# Patient Record
Sex: FEMALE | Race: WHITE | Employment: UNEMPLOYED | URBAN - METROPOLITAN AREA
[De-identification: names, ages, dates, MRNs, and addresses within clinical notes are randomized per-mention and may not be internally consistent; named-entity substitution may affect disease eponyms.]

---

## 2017-08-31 ENCOUNTER — HOSPITAL ENCOUNTER (OUTPATIENT)
Age: 57
Setting detail: SPECIMEN
Discharge: HOME | End: 2017-08-31
Payer: COMMERCIAL

## 2017-08-31 ENCOUNTER — HOSPITAL ENCOUNTER (OUTPATIENT)
Facility: CLINIC | Age: 57
Setting detail: OUTPATIENT SURGERY
Discharge: HOME | End: 2017-08-31
Attending: PLASTIC SURGERY | Admitting: PLASTIC SURGERY
Payer: COMMERCIAL

## 2017-08-31 VITALS
RESPIRATION RATE: 16 BRPM | HEIGHT: 65 IN | SYSTOLIC BLOOD PRESSURE: 139 MMHG | BODY MASS INDEX: 29.49 KG/M2 | HEART RATE: 64 BPM | DIASTOLIC BLOOD PRESSURE: 89 MMHG | TEMPERATURE: 96.8 F | WEIGHT: 177 LBS | OXYGEN SATURATION: 94 %

## 2017-08-31 PROCEDURE — 3600000002 HC SURGERY LEVEL 2 BASE: Performed by: PLASTIC SURGERY

## 2017-08-31 PROCEDURE — 7100000010 HC PHASE II RECOVERY - FIRST 15 MIN: Performed by: PLASTIC SURGERY

## 2017-08-31 PROCEDURE — 3600000012 HC SURGERY LEVEL 2 ADDTL 15MIN: Performed by: PLASTIC SURGERY

## 2017-08-31 PROCEDURE — 2500000003 HC RX 250 WO HCPCS: Performed by: PLASTIC SURGERY

## 2017-08-31 RX ORDER — BUPIVACAINE HYDROCHLORIDE AND EPINEPHRINE 2.5; 5 MG/ML; UG/ML
INJECTION, SOLUTION EPIDURAL; INFILTRATION; INTRACAUDAL; PERINEURAL PRN
Status: DISCONTINUED | OUTPATIENT
Start: 2017-08-31 | End: 2017-08-31 | Stop reason: HOSPADM

## 2017-08-31 ASSESSMENT — PAIN - FUNCTIONAL ASSESSMENT: PAIN_FUNCTIONAL_ASSESSMENT: 0-10

## 2017-09-05 LAB — DERMATOLOGY PATHOLOGY REPORT: NORMAL

## 2018-05-07 ENCOUNTER — HOSPITAL ENCOUNTER (OUTPATIENT)
Age: 58
Setting detail: SPECIMEN
Discharge: HOME | End: 2018-05-07
Payer: COMMERCIAL

## 2018-05-07 ENCOUNTER — HOSPITAL ENCOUNTER (OUTPATIENT)
Facility: CLINIC | Age: 58
Setting detail: OUTPATIENT SURGERY
Discharge: HOME | End: 2018-05-07
Attending: PLASTIC SURGERY | Admitting: PLASTIC SURGERY
Payer: COMMERCIAL

## 2018-05-07 VITALS
SYSTOLIC BLOOD PRESSURE: 169 MMHG | OXYGEN SATURATION: 97 % | HEIGHT: 65 IN | RESPIRATION RATE: 16 BRPM | HEART RATE: 65 BPM | WEIGHT: 178 LBS | BODY MASS INDEX: 29.66 KG/M2 | TEMPERATURE: 97.9 F | DIASTOLIC BLOOD PRESSURE: 91 MMHG

## 2018-05-07 PROCEDURE — 3600000012 HC SURGERY LEVEL 2 ADDTL 15MIN: Performed by: PLASTIC SURGERY

## 2018-05-07 PROCEDURE — 7100000010 HC PHASE II RECOVERY - FIRST 15 MIN: Performed by: PLASTIC SURGERY

## 2018-05-07 PROCEDURE — 3600000002 HC SURGERY LEVEL 2 BASE: Performed by: PLASTIC SURGERY

## 2018-05-07 PROCEDURE — 2500000003 HC RX 250 WO HCPCS: Performed by: PLASTIC SURGERY

## 2018-05-07 RX ORDER — BUPIVACAINE HYDROCHLORIDE AND EPINEPHRINE 2.5; 5 MG/ML; UG/ML
INJECTION, SOLUTION EPIDURAL; INFILTRATION; INTRACAUDAL; PERINEURAL PRN
Status: DISCONTINUED | OUTPATIENT
Start: 2018-05-07 | End: 2018-05-07 | Stop reason: HOSPADM

## 2018-05-07 ASSESSMENT — PAIN SCALES - GENERAL: PAINLEVEL_OUTOF10: 0

## 2018-05-07 ASSESSMENT — PAIN - FUNCTIONAL ASSESSMENT: PAIN_FUNCTIONAL_ASSESSMENT: 0-10

## 2018-05-09 LAB — SURGICAL PATHOLOGY REPORT: NORMAL

## 2020-06-24 NOTE — PROGRESS NOTES
Presurgery lab results reviewed from [facility] results. No BUN or creatinine results there since (abnormal) per medical record though ordered by surgeon. . Dr. Michael Iqbal office notified and requested labs be drawn day of surgery to complete the ordered testing. Case reviewed and approved by Dr. Isidro Sesay.

## 2020-06-29 ENCOUNTER — ANESTHESIA EVENT (OUTPATIENT)
Dept: OPERATING ROOM | Age: 60
End: 2020-06-29
Payer: COMMERCIAL

## 2020-06-30 ENCOUNTER — HOSPITAL ENCOUNTER (OUTPATIENT)
Dept: PREADMISSION TESTING | Age: 60
Discharge: HOME | End: 2020-07-04
Payer: COMMERCIAL

## 2020-06-30 PROCEDURE — U0004 COV-19 TEST NON-CDC HGH THRU: HCPCS

## 2020-07-01 ENCOUNTER — TELEPHONE (OUTPATIENT)
Dept: PRIMARY CARE CLINIC | Age: 60
End: 2020-07-01

## 2020-07-02 ENCOUNTER — ANESTHESIA (OUTPATIENT)
Dept: OPERATING ROOM | Age: 60
End: 2020-07-02
Payer: COMMERCIAL

## 2020-07-02 ENCOUNTER — HOSPITAL ENCOUNTER (OUTPATIENT)
Age: 60
Setting detail: OUTPATIENT SURGERY
Discharge: HOME | End: 2020-07-02
Attending: PLASTIC SURGERY | Admitting: PLASTIC SURGERY
Payer: COMMERCIAL

## 2020-07-02 VITALS
HEART RATE: 47 BPM | WEIGHT: 181.4 LBS | TEMPERATURE: 96.1 F | BODY MASS INDEX: 30.22 KG/M2 | SYSTOLIC BLOOD PRESSURE: 106 MMHG | HEIGHT: 65 IN | RESPIRATION RATE: 18 BRPM | OXYGEN SATURATION: 99 % | DIASTOLIC BLOOD PRESSURE: 43 MMHG

## 2020-07-02 VITALS — DIASTOLIC BLOOD PRESSURE: 21 MMHG | SYSTOLIC BLOOD PRESSURE: 52 MMHG | OXYGEN SATURATION: 100 % | TEMPERATURE: 93 F

## 2020-07-02 LAB
BUN BLDV-MCNC: 15 MG/DL (ref 6–20)
CREAT SERPL-MCNC: 1.03 MG/DL (ref 0.5–0.9)
GFR AFRICAN AMERICAN: >60 ML/MIN
GFR NON-AFRICAN AMERICAN: 55 ML/MIN
GFR SERPL CREATININE-BSD FRML MDRD: ABNORMAL ML/MIN/{1.73_M2}
GFR SERPL CREATININE-BSD FRML MDRD: ABNORMAL ML/MIN/{1.73_M2}
SARS-COV-2, PCR: NOT DETECTED
SARS-COV-2, RAPID: NORMAL
SARS-COV-2: NORMAL
SOURCE: NORMAL

## 2020-07-02 PROCEDURE — 88304 TISSUE EXAM BY PATHOLOGIST: CPT

## 2020-07-02 PROCEDURE — 3600000002 HC SURGERY LEVEL 2 BASE: Performed by: PLASTIC SURGERY

## 2020-07-02 PROCEDURE — 2500000003 HC RX 250 WO HCPCS: Performed by: PLASTIC SURGERY

## 2020-07-02 PROCEDURE — 7100000000 HC PACU RECOVERY - FIRST 15 MIN: Performed by: PLASTIC SURGERY

## 2020-07-02 PROCEDURE — 7100000011 HC PHASE II RECOVERY - ADDTL 15 MIN: Performed by: PLASTIC SURGERY

## 2020-07-02 PROCEDURE — 7100000001 HC PACU RECOVERY - ADDTL 15 MIN: Performed by: PLASTIC SURGERY

## 2020-07-02 PROCEDURE — 6360000002 HC RX W HCPCS: Performed by: NURSE ANESTHETIST, CERTIFIED REGISTERED

## 2020-07-02 PROCEDURE — 2580000003 HC RX 258: Performed by: ANESTHESIOLOGY

## 2020-07-02 PROCEDURE — 36415 COLL VENOUS BLD VENIPUNCTURE: CPT

## 2020-07-02 PROCEDURE — 2709999900 HC NON-CHARGEABLE SUPPLY: Performed by: PLASTIC SURGERY

## 2020-07-02 PROCEDURE — 3700000001 HC ADD 15 MINUTES (ANESTHESIA): Performed by: PLASTIC SURGERY

## 2020-07-02 PROCEDURE — 2500000003 HC RX 250 WO HCPCS: Performed by: NURSE ANESTHETIST, CERTIFIED REGISTERED

## 2020-07-02 PROCEDURE — 82565 ASSAY OF CREATININE: CPT

## 2020-07-02 PROCEDURE — 3700000000 HC ANESTHESIA ATTENDED CARE: Performed by: PLASTIC SURGERY

## 2020-07-02 PROCEDURE — 6360000002 HC RX W HCPCS

## 2020-07-02 PROCEDURE — 7100000010 HC PHASE II RECOVERY - FIRST 15 MIN: Performed by: PLASTIC SURGERY

## 2020-07-02 PROCEDURE — 3600000012 HC SURGERY LEVEL 2 ADDTL 15MIN: Performed by: PLASTIC SURGERY

## 2020-07-02 PROCEDURE — 6370000000 HC RX 637 (ALT 250 FOR IP)

## 2020-07-02 PROCEDURE — 84520 ASSAY OF UREA NITROGEN: CPT

## 2020-07-02 RX ORDER — BUPIVACAINE HYDROCHLORIDE AND EPINEPHRINE 2.5; 5 MG/ML; UG/ML
INJECTION, SOLUTION EPIDURAL; INFILTRATION; INTRACAUDAL; PERINEURAL PRN
Status: DISCONTINUED | OUTPATIENT
Start: 2020-07-02 | End: 2020-07-02 | Stop reason: ALTCHOICE

## 2020-07-02 RX ORDER — HYDRALAZINE HYDROCHLORIDE 20 MG/ML
5 INJECTION INTRAMUSCULAR; INTRAVENOUS EVERY 10 MIN PRN
Status: DISCONTINUED | OUTPATIENT
Start: 2020-07-02 | End: 2020-07-02 | Stop reason: HOSPADM

## 2020-07-02 RX ORDER — HYDROCODONE BITARTRATE AND ACETAMINOPHEN 5; 325 MG/1; MG/1
1 TABLET ORAL EVERY 6 HOURS PRN
Qty: 20 TABLET | Refills: 0 | Status: SHIPPED | OUTPATIENT
Start: 2020-07-02 | End: 2020-07-07

## 2020-07-02 RX ORDER — CLINDAMYCIN PHOSPHATE 900 MG/50ML
INJECTION INTRAVENOUS
Status: DISCONTINUED
Start: 2020-07-02 | End: 2020-07-02 | Stop reason: HOSPADM

## 2020-07-02 RX ORDER — ONDANSETRON 2 MG/ML
4 INJECTION INTRAMUSCULAR; INTRAVENOUS
Status: DISCONTINUED | OUTPATIENT
Start: 2020-07-02 | End: 2020-07-02 | Stop reason: HOSPADM

## 2020-07-02 RX ORDER — SODIUM CHLORIDE 0.9 % (FLUSH) 0.9 %
10 SYRINGE (ML) INJECTION PRN
Status: DISCONTINUED | OUTPATIENT
Start: 2020-07-02 | End: 2020-07-02 | Stop reason: HOSPADM

## 2020-07-02 RX ORDER — PROPOFOL 10 MG/ML
INJECTION, EMULSION INTRAVENOUS PRN
Status: DISCONTINUED | OUTPATIENT
Start: 2020-07-02 | End: 2020-07-02 | Stop reason: SDUPTHER

## 2020-07-02 RX ORDER — ONDANSETRON 2 MG/ML
INJECTION INTRAMUSCULAR; INTRAVENOUS PRN
Status: DISCONTINUED | OUTPATIENT
Start: 2020-07-02 | End: 2020-07-02 | Stop reason: SDUPTHER

## 2020-07-02 RX ORDER — SODIUM CHLORIDE, SODIUM LACTATE, POTASSIUM CHLORIDE, CALCIUM CHLORIDE 600; 310; 30; 20 MG/100ML; MG/100ML; MG/100ML; MG/100ML
INJECTION, SOLUTION INTRAVENOUS CONTINUOUS
Status: DISCONTINUED | OUTPATIENT
Start: 2020-07-02 | End: 2020-07-02 | Stop reason: SDUPTHER

## 2020-07-02 RX ORDER — SODIUM CHLORIDE 0.9 % (FLUSH) 0.9 %
10 SYRINGE (ML) INJECTION PRN
Status: DISCONTINUED | OUTPATIENT
Start: 2020-07-02 | End: 2020-07-02 | Stop reason: SDUPTHER

## 2020-07-02 RX ORDER — LIDOCAINE HYDROCHLORIDE 10 MG/ML
INJECTION, SOLUTION EPIDURAL; INFILTRATION; INTRACAUDAL; PERINEURAL PRN
Status: DISCONTINUED | OUTPATIENT
Start: 2020-07-02 | End: 2020-07-02 | Stop reason: SDUPTHER

## 2020-07-02 RX ORDER — DIPHENHYDRAMINE HYDROCHLORIDE 50 MG/ML
12.5 INJECTION INTRAMUSCULAR; INTRAVENOUS
Status: DISCONTINUED | OUTPATIENT
Start: 2020-07-02 | End: 2020-07-02 | Stop reason: HOSPADM

## 2020-07-02 RX ORDER — BUPIVACAINE HYDROCHLORIDE AND EPINEPHRINE 2.5; 5 MG/ML; UG/ML
INJECTION, SOLUTION INFILTRATION; PERINEURAL
Status: DISCONTINUED
Start: 2020-07-02 | End: 2020-07-02 | Stop reason: HOSPADM

## 2020-07-02 RX ORDER — MIDAZOLAM HYDROCHLORIDE 1 MG/ML
INJECTION INTRAMUSCULAR; INTRAVENOUS PRN
Status: DISCONTINUED | OUTPATIENT
Start: 2020-07-02 | End: 2020-07-02 | Stop reason: SDUPTHER

## 2020-07-02 RX ORDER — DEXAMETHASONE SODIUM PHOSPHATE 10 MG/ML
INJECTION, SOLUTION INTRAMUSCULAR; INTRAVENOUS PRN
Status: DISCONTINUED | OUTPATIENT
Start: 2020-07-02 | End: 2020-07-02 | Stop reason: SDUPTHER

## 2020-07-02 RX ORDER — SODIUM CHLORIDE 9 MG/ML
INJECTION, SOLUTION INTRAVENOUS CONTINUOUS
Status: DISCONTINUED | OUTPATIENT
Start: 2020-07-02 | End: 2020-07-02 | Stop reason: HOSPADM

## 2020-07-02 RX ORDER — SCOLOPAMINE TRANSDERMAL SYSTEM 1 MG/1
1 PATCH, EXTENDED RELEASE TRANSDERMAL
Status: DISCONTINUED | OUTPATIENT
Start: 2020-07-02 | End: 2020-07-02 | Stop reason: HOSPADM

## 2020-07-02 RX ORDER — METOCLOPRAMIDE HYDROCHLORIDE 5 MG/ML
10 INJECTION INTRAMUSCULAR; INTRAVENOUS ONCE
Status: COMPLETED | OUTPATIENT
Start: 2020-07-02 | End: 2020-07-02

## 2020-07-02 RX ORDER — PROPOFOL 10 MG/ML
INJECTION, EMULSION INTRAVENOUS CONTINUOUS PRN
Status: DISCONTINUED | OUTPATIENT
Start: 2020-07-02 | End: 2020-07-02 | Stop reason: SDUPTHER

## 2020-07-02 RX ORDER — PROMETHAZINE HYDROCHLORIDE 25 MG/ML
6.25 INJECTION, SOLUTION INTRAMUSCULAR; INTRAVENOUS
Status: DISCONTINUED | OUTPATIENT
Start: 2020-07-02 | End: 2020-07-02 | Stop reason: HOSPADM

## 2020-07-02 RX ORDER — SODIUM CHLORIDE 0.9 % (FLUSH) 0.9 %
10 SYRINGE (ML) INJECTION EVERY 12 HOURS SCHEDULED
Status: DISCONTINUED | OUTPATIENT
Start: 2020-07-02 | End: 2020-07-02 | Stop reason: HOSPADM

## 2020-07-02 RX ORDER — CLINDAMYCIN HYDROCHLORIDE 300 MG/1
300 CAPSULE ORAL 3 TIMES DAILY
Qty: 21 CAPSULE | Refills: 0 | Status: SHIPPED | OUTPATIENT
Start: 2020-07-02 | End: 2020-07-09

## 2020-07-02 RX ORDER — PROPOFOL 10 MG/ML
INJECTION, EMULSION INTRAVENOUS
Status: COMPLETED
Start: 2020-07-02 | End: 2020-07-02

## 2020-07-02 RX ORDER — FENTANYL CITRATE 50 UG/ML
INJECTION, SOLUTION INTRAMUSCULAR; INTRAVENOUS PRN
Status: DISCONTINUED | OUTPATIENT
Start: 2020-07-02 | End: 2020-07-02 | Stop reason: SDUPTHER

## 2020-07-02 RX ORDER — SODIUM CHLORIDE 0.9 % (FLUSH) 0.9 %
10 SYRINGE (ML) INJECTION EVERY 12 HOURS SCHEDULED
Status: DISCONTINUED | OUTPATIENT
Start: 2020-07-02 | End: 2020-07-02 | Stop reason: SDUPTHER

## 2020-07-02 RX ORDER — SCOLOPAMINE TRANSDERMAL SYSTEM 1 MG/1
PATCH, EXTENDED RELEASE TRANSDERMAL
Status: COMPLETED
Start: 2020-07-02 | End: 2020-07-02

## 2020-07-02 RX ORDER — FENTANYL CITRATE 50 UG/ML
25 INJECTION, SOLUTION INTRAMUSCULAR; INTRAVENOUS EVERY 5 MIN PRN
Status: DISCONTINUED | OUTPATIENT
Start: 2020-07-02 | End: 2020-07-02 | Stop reason: HOSPADM

## 2020-07-02 RX ORDER — SODIUM CHLORIDE 9 MG/ML
INJECTION, SOLUTION INTRAVENOUS CONTINUOUS
Status: DISCONTINUED | OUTPATIENT
Start: 2020-07-02 | End: 2020-07-02 | Stop reason: SDUPTHER

## 2020-07-02 RX ORDER — METOCLOPRAMIDE HYDROCHLORIDE 5 MG/ML
INJECTION INTRAMUSCULAR; INTRAVENOUS
Status: COMPLETED
Start: 2020-07-02 | End: 2020-07-02

## 2020-07-02 RX ORDER — CLINDAMYCIN PHOSPHATE 150 MG/ML
INJECTION, SOLUTION INTRAVENOUS PRN
Status: DISCONTINUED | OUTPATIENT
Start: 2020-07-02 | End: 2020-07-02 | Stop reason: SDUPTHER

## 2020-07-02 RX ORDER — SODIUM CHLORIDE, SODIUM LACTATE, POTASSIUM CHLORIDE, CALCIUM CHLORIDE 600; 310; 30; 20 MG/100ML; MG/100ML; MG/100ML; MG/100ML
INJECTION, SOLUTION INTRAVENOUS CONTINUOUS
Status: DISCONTINUED | OUTPATIENT
Start: 2020-07-02 | End: 2020-07-02 | Stop reason: HOSPADM

## 2020-07-02 RX ORDER — EPHEDRINE SULFATE/0.9% NACL/PF 50 MG/5 ML
SYRINGE (ML) INTRAVENOUS PRN
Status: DISCONTINUED | OUTPATIENT
Start: 2020-07-02 | End: 2020-07-02 | Stop reason: SDUPTHER

## 2020-07-02 RX ADMIN — Medication 10 MG: at 09:19

## 2020-07-02 RX ADMIN — Medication 10 MG: at 09:12

## 2020-07-02 RX ADMIN — SODIUM CHLORIDE, POTASSIUM CHLORIDE, SODIUM LACTATE AND CALCIUM CHLORIDE: 600; 310; 30; 20 INJECTION, SOLUTION INTRAVENOUS at 09:07

## 2020-07-02 RX ADMIN — CLINDAMYCIN PHOSPHATE 900 MG: 150 INJECTION, SOLUTION INTRAMUSCULAR; INTRAVENOUS at 08:17

## 2020-07-02 RX ADMIN — SODIUM CHLORIDE, POTASSIUM CHLORIDE, SODIUM LACTATE AND CALCIUM CHLORIDE: 600; 310; 30; 20 INJECTION, SOLUTION INTRAVENOUS at 08:09

## 2020-07-02 RX ADMIN — FENTANYL CITRATE 100 MCG: 50 INJECTION INTRAMUSCULAR; INTRAVENOUS at 08:12

## 2020-07-02 RX ADMIN — DEXAMETHASONE SODIUM PHOSPHATE 5 MG: 10 INJECTION, SOLUTION INTRAMUSCULAR; INTRAVENOUS at 08:31

## 2020-07-02 RX ADMIN — MIDAZOLAM HYDROCHLORIDE 2 MG: 1 INJECTION, SOLUTION INTRAMUSCULAR; INTRAVENOUS at 08:09

## 2020-07-02 RX ADMIN — METOCLOPRAMIDE HYDROCHLORIDE 10 MG: 5 INJECTION INTRAMUSCULAR; INTRAVENOUS at 09:32

## 2020-07-02 RX ADMIN — PROPOFOL 200 MCG/KG/MIN: 10 INJECTION, EMULSION INTRAVENOUS at 08:14

## 2020-07-02 RX ADMIN — ONDANSETRON 4 MG: 2 INJECTION, SOLUTION INTRAMUSCULAR; INTRAVENOUS at 08:31

## 2020-07-02 RX ADMIN — LIDOCAINE HYDROCHLORIDE 50 MG: 10 INJECTION, SOLUTION EPIDURAL; INFILTRATION; INTRACAUDAL; PERINEURAL at 08:12

## 2020-07-02 RX ADMIN — METOCLOPRAMIDE 10 MG: 5 INJECTION, SOLUTION INTRAMUSCULAR; INTRAVENOUS at 09:32

## 2020-07-02 RX ADMIN — PROPOFOL 160 MG: 10 INJECTION, EMULSION INTRAVENOUS at 08:12

## 2020-07-02 RX ADMIN — SODIUM CHLORIDE, POTASSIUM CHLORIDE, SODIUM LACTATE AND CALCIUM CHLORIDE: 600; 310; 30; 20 INJECTION, SOLUTION INTRAVENOUS at 07:48

## 2020-07-02 ASSESSMENT — PULMONARY FUNCTION TESTS
PIF_VALUE: 16
PIF_VALUE: 3
PIF_VALUE: 16
PIF_VALUE: 14
PIF_VALUE: 3
PIF_VALUE: 16
PIF_VALUE: 14
PIF_VALUE: 14
PIF_VALUE: 16
PIF_VALUE: 14
PIF_VALUE: 0
PIF_VALUE: 14
PIF_VALUE: 16
PIF_VALUE: 14
PIF_VALUE: 8
PIF_VALUE: 16
PIF_VALUE: 0
PIF_VALUE: 14
PIF_VALUE: 16
PIF_VALUE: 16
PIF_VALUE: 14
PIF_VALUE: 16
PIF_VALUE: 14
PIF_VALUE: 14
PIF_VALUE: 16
PIF_VALUE: 15
PIF_VALUE: 16
PIF_VALUE: 14
PIF_VALUE: 16
PIF_VALUE: 16
PIF_VALUE: 14
PIF_VALUE: 16
PIF_VALUE: 14
PIF_VALUE: 16
PIF_VALUE: 14
PIF_VALUE: 16
PIF_VALUE: 2
PIF_VALUE: 16

## 2020-07-02 ASSESSMENT — LIFESTYLE VARIABLES: SMOKING_STATUS: 1

## 2020-07-02 ASSESSMENT — PAIN SCALES - GENERAL
PAINLEVEL_OUTOF10: 0
PAINLEVEL_OUTOF10: 0

## 2020-07-02 ASSESSMENT — PAIN - FUNCTIONAL ASSESSMENT: PAIN_FUNCTIONAL_ASSESSMENT: 0-10

## 2020-07-02 NOTE — ANESTHESIA PRE PROCEDURE
Department of Anesthesiology  Preprocedure Note       Name:  Kamilah Marsh   Age:  61 y.o.  :  1960                                          MRN:  [id number]         Date:  2020      Surgeon: Dakota Wright): Joseline Xavier MD    Procedure: Procedure(s):  TRUNK LESION BIOPSY EXCISION - SOFT TISSUE MASS UPPER BACK    Medications prior to admission:   Prior to Admission medications    Medication Sig Start Date End Date Taking? Authorizing Provider   carvedilol (COREG) 12.5 MG tablet Take 12.5 mg by mouth daily   Yes Historical Provider, MD   clopidogrel (PLAVIX) 75 MG tablet Take 75 mg by mouth daily   Yes Historical Provider, MD   azithromycin (ZITHROMAX) 250 MG tablet Take 250 mg by mouth daily   Yes Historical Provider, MD   calcium carbonate (OSCAL) 500 MG TABS tablet Take 500 mg by mouth daily   Yes Historical Provider, MD   atorvastatin (LIPITOR) 80 MG tablet Take 80 mg by mouth daily   Yes Historical Provider, MD   lisinopril (PRINIVIL;ZESTRIL) 30 MG tablet  5/15/16  Yes Historical Provider, MD   ALPRAZolam (XANAX) 0.25 MG tablet Take 0.25 mg by mouth nightly as needed for Sleep. Historical Provider, MD   aspirin 81 MG tablet Take 81 mg by mouth daily    Historical Provider, MD       Current medications:    Current Facility-Administered Medications   Medication Dose Route Frequency Provider Last Rate Last Dose    0.9 % sodium chloride infusion   Intravenous Continuous Shashank Garcia MD        lactated ringers infusion   Intravenous Continuous Shashank Garcia MD        sodium chloride flush 0.9 % injection 10 mL  10 mL Intravenous 2 times per day Shashank Garcia MD        sodium chloride flush 0.9 % injection 10 mL  10 mL Intravenous PRN Shashank Garcia MD           Allergies:     Allergies   Allergen Reactions    Codeine Hives    Keflet [Cephalexin] Swelling     Swelling of the lips    Percocet [Oxycodone-Acetaminophen] Nausea Only       Problem List:    Patient Active Problem List   Diagnosis Code    Trigger finger of left thumb M65.312    Neoplasm of uncertain behavior of skin D48.5       Past Medical History:        Diagnosis Date    HTN (hypertension)     Hyperlipidemia     Hypertension     PONV (postoperative nausea and vomiting)     with general anesthesia not MAC       Past Surgical History:        Procedure Laterality Date    CARDIAC CATHETERIZATION      CAROTID ENDARTERECTOMY  09/20/2016    right    CHOLECYSTECTOMY      EXCISION / BIOPSY SKIN LESION OF TRUNK N/A 8/31/2017    EXCISIONAL BIOPSY LESIONS ON BACK X4, LEFT AXILLAE X1  performed by Jermaine Becker MD at [address] [facility] Bilateral     HYSTERECTOMY      RI EXC SKIN BENIG 1.1-2CM FACE,FACIAL N/A 5/7/2018    EXCISION LESIONS X2 ON BACK performed by Jermaine Becker MD at [address] [place] / [address]      lesions on back x4 and left axillary x1    PRE-MALIGNA[place] / BENIGN SKIN LESION EXCISION  05/07/2018    excision lesion x3 back    ROTATOR CUFF REPAIR Right        Social History:    Social History     Tobacco Use    Smoking status: Current Every Day Smoker     Packs/day: 0.50     Start date: 6/19/1981    Smokeless tobacco: Never Used   Substance Use Topics    Alcohol use: Yes     Comment: not daily only socially                                Ready to quit: Not Answered  Counseling given: Not Answered      Vital Signs (Current):   Vitals:    06/19/20 1520 07/02/20 0706   Weight: 180 lb (81.6 kg)    Height: 5' 5\" (1.651 m) 5' 5\" (1.651 m)                                              BP Readings from Last 3 Encounters:   02/12/20 (!) 139/90   05/23/18 128/66   05/07/18 (!) 169/91       NPO Status:  after MN                                                                               BMI:   Wt Readings from Last 3 Encounters:   06/19/20 180 lb (81.6 kg)   02/12/20 168 lb (76.2 kg)   05/23/18 160 lb (72.6 kg)     Body mass index is 29.95 kg/m².     CBC: No results found for: WBC, RBC,

## 2020-07-02 NOTE — OP NOTE
Dermabond and Steri-Strips. Patient tolerated the procedure well was taken to postop recovery in stable condition.

## 2020-07-02 NOTE — ANESTHESIA POSTPROCEDURE EVALUATION
Department of Anesthesiology  Postprocedure Note    Patient: Portia Montemayor  MRN: [id number]  YOB: 1960  Date of evaluation: 7/2/2020  Time:  10:04 AM     Procedure Summary     Date:  07/02/20 Room / Location:  [facility] OR 01 / 4 [facility]    Anesthesia Start:  0809 Anesthesia Stop:  2495    Procedure:  [address] - SOFT TISSUE MASS UPPER BACK (Left ) Diagnosis:  (SOFT TISSUE MASS UPPER BACK)    Surgeon:  Suleiman Carreon MD Responsible Provider:  PUNEET Engle CRNA    Anesthesia Type:  general ASA Status:  2          Anesthesia Type: general    Kenneth Phase I: Kenneth Score: 5    Kenneth Phase II: Kenneth Score: 10    Last vitals: Reviewed and per EMR flowsheets.        Anesthesia Post Evaluation    Patient location during evaluation: PACU  Patient participation: complete - patient participated  Level of consciousness: awake and alert  Pain score: 0  Airway patency: patent  Nausea & Vomiting: no nausea and no vomiting  Complications: no  Cardiovascular status: blood pressure returned to baseline  Respiratory status: acceptable and room air  Hydration status: euvolemic

## 2020-07-02 NOTE — BRIEF OP NOTE
Brief Postoperative Note      Patient: Aashish Noyola  YOB: 1960  MRN: [id number]    Date of Procedure: 7/2/2020    Pre-Op Diagnosis: SOFT TISSUE MASS UPPER BACK, 10 cm in diameter    Post-Op Diagnosis: Same       Procedure(s):  TRUNK LESION BIOPSY EXCISION - SOFT TISSUE MASS UPPER BACK, submuscular and subfascial approx 10 cm in diameter    Surgeon(s):   Fatoumata Olivier MD    Assistant:  * No surgical staff found *    Anesthesia: General    Estimated Blood Loss (mL): Minimal    Complications: None    Specimens:   ID Type Source Tests Collected by Time Destination   A : SOFT TISSUE MASS LEFT UPPER BACK Tissue Tissue SURGICAL PATHOLOGY A Solange Peña MD 7/2/2020 8988        Implants:  * No implants in log *      Drains: * No LDAs found *    Findings: submuscular and subfascial lipoma    Electronically signed by Fatoumata Olivier MD on 7/2/2020 at 9:06 AM

## 2020-07-02 NOTE — H&P
Office Note     SANDY Boyd MD, FACS     Subjective:      Patient ID: Irineo Albright is a 61 y.o. female.     HPI  Patient comes in today with a large soft tissue mass on her left upper back consistent with a lipoma. It has continued to grow and is bothering her, interfering with her daily activities. An ultrasound was performed confirming the lipoma. She comes in today for further consultation and treatment options.   Review of Systems     Past Medical History        Past Medical History:   Diagnosis Date    HTN (hypertension)      Hyperlipidemia      Hypertension      PONV (postoperative nausea and vomiting)           Past Surgical History         Past Surgical History:   Procedure Laterality Date    CARDIAC CATHETERIZATION        CAROTID ENDARTERECTOMY   09/20/2016     right    CHOLECYSTECTOMY        EXCISION / BIOPSY SKIN LESION OF TRUNK N/A 8/31/2017     EXCISIONAL BIOPSY LESIONS ON BACK X4, LEFT AXILLAE X1  performed by Nicolás Genao MD at [address] [facility] Bilateral      HYSTERECTOMY        RI EXC SKIN BENIG 1.1-2CM FACE,FACIAL N/A 5/7/2018     EXCISION LESIONS X2 ON BACK performed by Nicolás Genao MD at [address] [place] / BENIGN SKIN LESION EXCISION         lesions on back x4 and left axillary x1    PRE-MALIGNANT / BENIGN SKIN LESION EXCISION   05/07/2018     excision lesion x3 back    ROTATOR CUFF REPAIR                   Allergies   Allergen Reactions    Codeine Hives    Keflet [Cephalexin] Swelling       Swelling of the lips    Percocet [Oxycodone-Acetaminophen] Nausea Only      Current Facility-Administered Medications          Current Outpatient Medications   Medication Sig Dispense Refill    carvedilol (COREG) 12.5 MG tablet Take 12.5 mg by mouth daily        ALPRAZolam (XANAX) 0.25 MG tablet Take 0.25 mg by mouth nightly as needed for Sleep.        clopidogrel (PLAVIX) 75 MG tablet Take 75 mg by mouth daily        calcium carbonate (OSCAL) 500 MG TABS tablet Take 500 mg by mouth daily        atorvastatin (LIPITOR) 80 MG tablet Take 80 mg by mouth daily        lisinopril (PRINIVIL;ZESTRIL) 30 MG tablet          aspirin 81 MG tablet Take 81 mg by mouth daily        azithromycin (ZITHROMAX) 250 MG tablet Take 250 mg by mouth daily          No current facility-administered medications for this visit.          Social History               Socioeconomic History    Marital status:        Spouse name: Not on file    Number of children: Not on file    Years of education: Not on file    Highest education level: Not on file   Occupational History    Not on file   Social Needs    Financial resource strain: Not on file    Food insecurity:       Worry: Not on file       Inability: Not on file    Transportation needs:       Medical: Not on file       Non-medical: Not on file   Tobacco Use    Smoking status: Current Every Day Smoker       Packs/day: 0.50    Smokeless tobacco: Never Used   Substance and Sexual Activity    Alcohol use: Yes    Drug use: No    Sexual activity: Not on file   Lifestyle    Physical activity:       Days per week: Not on file       Minutes per session: Not on file    Stress: Not on file   Relationships    Social connections:       Talks on phone: Not on file       Gets together: Not on file       Attends Latter day service: Not on file       Active member of club or organization: Not on file       Attends meetings of clubs or organizations: Not on file       Relationship status: Not on file    Intimate partner violence:       Fear of current or ex partner: Not on file       Emotionally abused: Not on file       Physically abused: Not on file       Forced sexual activity: Not on file   Other Topics Concern    Not on file   Social History Narrative    Not on file         Family History   No family history on file. Review of systems is otherwise negative.   BP (!) 139/90   Pulse 89   Ht 5' 5\" (1.651 m)   Wt 168 lb (76.2 kg)   BMI 27.96 kg/m²         Objective:   Physical Exam  Vitals signs and nursing note reviewed. Constitutional:       Appearance: Normal appearance. She is well-developed. She is not diaphoretic. HENT:      Head: Normocephalic and atraumatic. Nose: Nose normal.   Eyes:      Conjunctiva/sclera: Conjunctivae normal.      Pupils: Pupils are equal, round, and reactive to light. Neck:      Musculoskeletal: Normal range of motion. Vascular: No JVD. Trachea: No tracheal deviation. Cardiovascular:      Rate and Rhythm: Normal rate. Pulmonary:      Effort: Pulmonary effort is normal. No respiratory distress. Breath sounds: No wheezing. Abdominal:      General: There is no distension. Palpations: Abdomen is soft. Musculoskeletal: Normal range of motion. General: No tenderness. Lymphadenopathy:      Cervical: No cervical adenopathy. Skin:     General: Skin is warm and dry. Coloration: Skin is not pale. Findings: No erythema or rash. Nails: There is no clubbing. Comments: Large Subcutaneous soft tissue mass left upper back consistent with a lipoma. Neurological:      Mental Status: She is alert and oriented to person, place, and time. Cranial Nerves: No cranial nerve deficit. Psychiatric:         Speech: Speech normal.         Behavior: Behavior normal.         Thought Content: Thought content normal.         Judgment: Judgment normal.            Assessment:   Large soft tissue mass left upper back consistent with a lipoma. Plan: We will get her scheduled for excision of her large left upper back soft tissue mass under general anesthesia. I discussed the procedure with her at great length. She understands the risk involved and wished to proceed.                   The patient was evaluated and examined with my nurse in the room at all times.   Portions of this note were transcribed using Momo voice recognition technology and as such may reflect some variations in voice recognition.     A Nadene Gitelman, MD

## 2020-07-03 LAB — SURGICAL PATHOLOGY REPORT: NORMAL

## 2023-07-14 ENCOUNTER — HOSPITAL ENCOUNTER (OUTPATIENT)
Age: 63
Setting detail: SPECIMEN
Discharge: HOME | End: 2023-07-14

## 2023-07-14 LAB
ALBUMIN SERPL-MCNC: 4.5 G/DL (ref 3.5–5.2)
ALBUMIN/GLOB SERPL: 1.8 {RATIO} (ref 1–2.5)
ALP SERPL-CCNC: 56 U/L (ref 35–104)
ALT SERPL-CCNC: 18 U/L (ref 5–33)
ANION GAP SERPL CALCULATED.3IONS-SCNC: 17 MMOL/L (ref 9–17)
AST SERPL-CCNC: 18 U/L
BASOPHILS # BLD: 0.08 K/UL (ref 0–0.2)
BASOPHILS NFR BLD: 1 % (ref 0–2)
BILIRUB SERPL-MCNC: 0.4 MG/DL (ref 0.3–1.2)
BUN SERPL-MCNC: 17 MG/DL (ref 8–23)
CALCIUM SERPL-MCNC: 9.5 MG/DL (ref 8.6–10.4)
CHLORIDE SERPL-SCNC: 105 MMOL/L (ref 98–107)
CHOLEST SERPL-MCNC: 125 MG/DL
CHOLESTEROL/HDL RATIO: 2.7
CO2 SERPL-SCNC: 21 MMOL/L (ref 20–31)
CREAT SERPL-MCNC: 1.1 MG/DL (ref 0.5–0.9)
EOSINOPHIL # BLD: 0.07 K/UL (ref 0–0.44)
EOSINOPHILS RELATIVE PERCENT: 1 % (ref 1–4)
ERYTHROCYTE [DISTWIDTH] IN BLOOD BY AUTOMATED COUNT: 14.4 % (ref 11.8–14.4)
GFR SERPL CREATININE-BSD FRML MDRD: 57 ML/MIN/1.73M2
GLUCOSE SERPL-MCNC: 100 MG/DL (ref 70–99)
HCT VFR BLD AUTO: 39.9 % (ref 36.3–47.1)
HDLC SERPL-MCNC: 46 MG/DL
HGB BLD-MCNC: 13.3 G/DL (ref 11.9–15.1)
IMM GRANULOCYTES # BLD AUTO: 0.03 K/UL (ref 0–0.3)
IMM GRANULOCYTES NFR BLD: 0 %
LDLC SERPL CALC-MCNC: 66 MG/DL (ref 0–130)
LYMPHOCYTES # BLD: 34 % (ref 24–43)
LYMPHOCYTES NFR BLD: 2.96 K/UL (ref 1.1–3.7)
MCH RBC QN AUTO: 30.6 PG (ref 25.2–33.5)
MCHC RBC AUTO-ENTMCNC: 33.3 G/DL (ref 28.4–34.8)
MCV RBC AUTO: 91.9 FL (ref 82.6–102.9)
MONOCYTES NFR BLD: 0.68 K/UL (ref 0.1–1.2)
MONOCYTES NFR BLD: 8 % (ref 3–12)
NEUTROPHILS NFR BLD: 56 % (ref 36–65)
NEUTS SEG NFR BLD: 4.89 K/UL (ref 1.5–8.1)
NRBC BLD-RTO: 0 PER 100 WBC
PLATELET # BLD AUTO: 261 K/UL (ref 138–453)
PMV BLD AUTO: 12.3 FL (ref 8.1–13.5)
POTASSIUM SERPL-SCNC: 5 MMOL/L (ref 3.7–5.3)
PROT SERPL-MCNC: 7 G/DL (ref 6.4–8.3)
RBC # BLD AUTO: 4.34 M/UL (ref 3.95–5.11)
SODIUM SERPL-SCNC: 143 MMOL/L (ref 135–144)
T4 FREE SERPL-MCNC: 1.4 NG/DL (ref 0.9–1.7)
TRIGL SERPL-MCNC: 64 MG/DL
TSH SERPL DL<=0.05 MIU/L-ACNC: 1.22 UIU/ML (ref 0.3–5)
WBC OTHER # BLD: 8.7 K/UL (ref 3.5–11.3)

## 2023-09-20 ENCOUNTER — OFFICE VISIT (OUTPATIENT)
Age: 63
End: 2023-09-20

## 2023-09-20 VITALS — BODY MASS INDEX: 29.16 KG/M2 | HEIGHT: 65 IN | WEIGHT: 175 LBS

## 2023-09-20 DIAGNOSIS — M25.511 RIGHT SHOULDER PAIN, UNSPECIFIED CHRONICITY: Primary | ICD-10-CM

## 2023-09-20 RX ORDER — LIDOCAINE HYDROCHLORIDE 10 MG/ML
2 INJECTION, SOLUTION INFILTRATION; PERINEURAL ONCE
Status: COMPLETED | OUTPATIENT
Start: 2023-09-20 | End: 2023-09-20

## 2023-09-20 RX ORDER — METHYLPREDNISOLONE ACETATE 80 MG/ML
80 INJECTION, SUSPENSION INTRA-ARTICULAR; INTRALESIONAL; INTRAMUSCULAR; SOFT TISSUE ONCE
Status: COMPLETED | OUTPATIENT
Start: 2023-09-20 | End: 2023-09-20

## 2023-09-20 RX ADMIN — METHYLPREDNISOLONE ACETATE 80 MG: 80 INJECTION, SUSPENSION INTRA-ARTICULAR; INTRALESIONAL; INTRAMUSCULAR; SOFT TISSUE at 15:23

## 2023-09-20 RX ADMIN — LIDOCAINE HYDROCHLORIDE 2 ML: 10 INJECTION, SOLUTION INFILTRATION; PERINEURAL at 15:22

## 2023-09-20 NOTE — PROGRESS NOTES
Administrations This Visit       lidocaine 1 % injection 2 mL       Admin Date  09/20/2023  15:22 Action  Given Dose  2 mL Route  Intra-artICUlar Site  Shoulder Right Administered By  Rosetta Stiles MA    Ordering Provider: Tima Murcia MD    NDC: 36412-109-21    Lot#: [id number]    : [address]    Patient Supplied?: No              methylPREDNISolone acetate (DEPO-MEDROL) injection 80 mg       Admin Date  09/20/2023  15:23 Action  Given Dose  80 mg Route  Intra-artICUlar Site  Shoulder Right Administered By  Rosetta Stiles MA    Ordering Provider: Tima Murcia MD    NDC: 4186-0287-54    Lot#: [id number]    : [address]. #2  11.[address] [facility]. Patient Supplied?: No                    Medication was administered by provider, Dr Canary Galeazzi. No adverse reactions.     Rosetta Stiles MA.
tablet, , Disp: , Rfl:     aspirin 81 MG tablet, Take 81 mg by mouth daily, Disp: , Rfl:   Allergies   Allergen Reactions    Codeine Hives    Keflet [Cephalexin] Swelling     Swelling of the lips    Percocet [Oxycodone-Acetaminophen] Nausea Only     Social History     Socioeconomic History    Marital status:      Spouse name: Not on file    Number of children: Not on file    Years of education: Not on file    Highest education level: Not on file   Occupational History    Not on file   Tobacco Use    Smoking status: Every Day     Packs/day: .5     Types: Cigarettes     Start date: 6/19/1981    Smokeless tobacco: Never   Vaping Use    Vaping Use: Never used   Substance and Sexual Activity    Alcohol use: Yes     Comment: not daily only socially    Drug use: No    Sexual activity: Not on file   Other Topics Concern    Not on file   Social History Narrative    Not on file     Social Determinants of Health     Financial Resource Strain: Not on file   Food Insecurity: Not on file   Transportation Needs: Not on file   Physical Activity: Not on file   Stress: Not on file   Social Connections: Not on file   Intimate Partner Violence: Not on file   Housing Stability: Not on file     Past Medical History:   Diagnosis Date    HTN (hypertension)     Hyperlipidemia     Hypertension     PONV (postoperative nausea and vomiting)     with general anesthesia not MAC     Past Surgical History:   Procedure Laterality Date    CARDIAC CATHETERIZATION      CAROTID ENDARTERECTOMY  09/20/2016    right    CHOLECYSTECTOMY      EXCISION / [place] N/A 8/31/2017    EXCISIONAL BIOPSY LESIONS ON BACK X4, LEFT AXILLAE X1  performed by Mehul Rockwell MD at [address] [facility] Bilateral     HYSTERECTOMY (CERVIX STATUS UNKNOWN)      AZ EXC B9 LES MRGN XCP SK TG F/E/E/N/L/M 1.1-2.0CM N/A 5/7/2018    EXCISION LESIONS X2 ON BACK performed by Mehul Rockwell MD at [address]    PRE-[place]AL[facility] /

## 2023-09-25 SDOH — ECONOMIC STABILITY: TRANSPORTATION INSECURITY
IN THE PAST 12 MONTHS, HAS LACK OF TRANSPORTATION KEPT YOU FROM MEETINGS, WORK, OR FROM GETTING THINGS NEEDED FOR DAILY LIVING?: NO

## 2023-09-25 SDOH — ECONOMIC STABILITY: FOOD INSECURITY: WITHIN THE PAST 12 MONTHS, YOU WORRIED THAT YOUR FOOD WOULD RUN OUT BEFORE YOU GOT MONEY TO BUY MORE.: SOMETIMES TRUE

## 2023-09-25 SDOH — ECONOMIC STABILITY: FOOD INSECURITY: WITHIN THE PAST 12 MONTHS, THE FOOD YOU BOUGHT JUST DIDN'T LAST AND YOU DIDN'T HAVE MONEY TO GET MORE.: SOMETIMES TRUE

## 2023-09-25 SDOH — ECONOMIC STABILITY: INCOME INSECURITY: HOW HARD IS IT FOR YOU TO PAY FOR THE VERY BASICS LIKE FOOD, HOUSING, MEDICAL CARE, AND HEATING?: NOT VERY HARD

## 2023-09-25 SDOH — ECONOMIC STABILITY: HOUSING INSECURITY
IN THE LAST 12 MONTHS, WAS THERE A TIME WHEN YOU DID NOT HAVE A STEADY PLACE TO SLEEP OR SLEPT IN A SHELTER (INCLUDING NOW)?: NO

## 2023-09-28 ENCOUNTER — OFFICE VISIT (OUTPATIENT)
Age: 63
End: 2023-09-28

## 2023-09-28 VITALS
DIASTOLIC BLOOD PRESSURE: 82 MMHG | RESPIRATION RATE: 16 BRPM | BODY MASS INDEX: 29.46 KG/M2 | HEIGHT: 65 IN | OXYGEN SATURATION: 97 % | HEART RATE: 55 BPM | SYSTOLIC BLOOD PRESSURE: 130 MMHG | WEIGHT: 176.8 LBS | TEMPERATURE: 97 F

## 2023-09-28 DIAGNOSIS — N18.31 CHRONIC RENAL FAILURE, STAGE 3A (HCC): ICD-10-CM

## 2023-09-28 DIAGNOSIS — I10 ESSENTIAL HYPERTENSION: ICD-10-CM

## 2023-09-28 DIAGNOSIS — H61.22 LEFT EAR IMPACTED CERUMEN: ICD-10-CM

## 2023-09-28 DIAGNOSIS — I65.23 BILATERAL CAROTID ARTERY STENOSIS: Primary | ICD-10-CM

## 2023-09-28 DIAGNOSIS — E78.5 HYPERLIPIDEMIA, UNSPECIFIED HYPERLIPIDEMIA TYPE: ICD-10-CM

## 2023-09-28 DIAGNOSIS — I51.81 TAKOTSUBO CARDIOMYOPATHY: ICD-10-CM

## 2023-09-28 DIAGNOSIS — Z12.31 BREAST CANCER SCREENING BY MAMMOGRAM: ICD-10-CM

## 2023-09-28 DIAGNOSIS — I25.10 ATHEROSCLEROSIS OF NATIVE CORONARY ARTERY OF NATIVE HEART WITHOUT ANGINA PECTORIS: ICD-10-CM

## 2023-09-28 DIAGNOSIS — R44.8 FACIAL PRESSURE: ICD-10-CM

## 2023-09-28 DIAGNOSIS — Z72.0 TOBACCO ABUSE: ICD-10-CM

## 2023-09-28 DIAGNOSIS — Z23 ENCOUNTER FOR IMMUNIZATION: ICD-10-CM

## 2023-09-28 PROBLEM — I65.29 STENOSIS OF CAROTID ARTERY: Status: ACTIVE | Noted: 2022-11-18

## 2023-09-28 PROBLEM — I15.2 OBESITY, DIABETES, AND HYPERTENSION SYNDROME (HCC): Status: ACTIVE | Noted: 2022-11-18

## 2023-09-28 PROBLEM — E55.9 VITAMIN D DEFICIENCY: Status: ACTIVE | Noted: 2022-11-18

## 2023-09-28 PROBLEM — F32.A DEPRESSION: Status: ACTIVE | Noted: 2022-11-18

## 2023-09-28 PROBLEM — F41.9 ANXIETY DISORDER: Status: ACTIVE | Noted: 2022-11-18

## 2023-09-28 PROBLEM — E11.69 OBESITY, DIABETES, AND HYPERTENSION SYNDROME (HCC): Status: ACTIVE | Noted: 2022-11-18

## 2023-09-28 PROBLEM — E11.59 OBESITY, DIABETES, AND HYPERTENSION SYNDROME (HCC): Status: ACTIVE | Noted: 2022-11-18

## 2023-09-28 PROBLEM — K21.9 GASTROESOPHAGEAL REFLUX DISEASE: Status: ACTIVE | Noted: 2022-11-18

## 2023-09-28 PROBLEM — E66.9 OBESITY, DIABETES, AND HYPERTENSION SYNDROME (HCC): Status: ACTIVE | Noted: 2022-11-18

## 2023-09-28 RX ORDER — LISINOPRIL 40 MG/1
40 TABLET ORAL DAILY
COMMUNITY
Start: 2023-07-10

## 2023-09-28 RX ORDER — ERGOCALCIFEROL 1.25 MG/1
50000 CAPSULE ORAL WEEKLY
COMMUNITY
Start: 2023-08-22

## 2023-09-28 RX ORDER — EZETIMIBE 10 MG/1
10 TABLET ORAL DAILY
COMMUNITY
Start: 2023-09-17

## 2023-09-28 ASSESSMENT — PATIENT HEALTH QUESTIONNAIRE - PHQ9
SUM OF ALL RESPONSES TO PHQ QUESTIONS 1-9: 4
7. TROUBLE CONCENTRATING ON THINGS, SUCH AS READING THE NEWSPAPER OR WATCHING TELEVISION: 0
SUM OF ALL RESPONSES TO PHQ9 QUESTIONS 1 & 2: 0
1. LITTLE INTEREST OR PLEASURE IN DOING THINGS: 0
8. MOVING OR SPEAKING SO SLOWLY THAT OTHER PEOPLE COULD HAVE NOTICED. OR THE OPPOSITE, BEING SO FIGETY OR RESTLESS THAT YOU HAVE BEEN MOVING AROUND A LOT MORE THAN USUAL: 0
10. IF YOU CHECKED OFF ANY PROBLEMS, HOW DIFFICULT HAVE THESE PROBLEMS MADE IT FOR YOU TO DO YOUR WORK, TAKE CARE OF THINGS AT HOME, OR GET ALONG WITH OTHER PEOPLE: 0
3. TROUBLE FALLING OR STAYING ASLEEP: 2
2. FEELING DOWN, DEPRESSED OR HOPELESS: 0
6. FEELING BAD ABOUT YOURSELF - OR THAT YOU ARE A FAILURE OR HAVE LET YOURSELF OR YOUR FAMILY DOWN: 0
SUM OF ALL RESPONSES TO PHQ QUESTIONS 1-9: 4
4. FEELING TIRED OR HAVING LITTLE ENERGY: 2
SUM OF ALL RESPONSES TO PHQ QUESTIONS 1-9: 4
9. THOUGHTS THAT YOU WOULD BE BETTER OFF DEAD, OR OF HURTING YOURSELF: 0
SUM OF ALL RESPONSES TO PHQ QUESTIONS 1-9: 4
5. POOR APPETITE OR OVEREATING: 0

## 2023-09-28 NOTE — PROGRESS NOTES
Ear flush both ears, curette used. Handled flush well. After obtaining consent, and per orders of Dr. Milo Doe, injection of Prevnar 20 and influenza vaccine given in Right deltoid (flu) left deltoid (prevnar 21) by Segun Williamson MA. Patient tolerated the injection well.
Prediabetic)  Never done    Diabetic Alb to Cr ratio (uACR) test  Never done    Diabetic retinal exam  Never done     There are no preventive care reminders to display for this patient. There are no preventive care reminders to display for this patient. Mammogram: 4/19/2022  PVX: 7/18/2017, 9/2022  DEXA: 9/15/2021, osteopenia  Colonoscopy: 12/28/2022, 3-5    LABS     Hospital Outpatient Visit on 07/14/2023   Component Date Value    WBC 07/14/2023 8.7     RBC 07/14/2023 4.34     Hemoglobin 07/14/2023 13.3     Hematocrit 07/14/2023 39.9     MCV 07/14/2023 91.9     MCH 07/14/2023 30.6     MCHC 07/14/2023 33.3     RDW 07/14/2023 14.4     Platelets 52/80/5496 261     MPV 07/14/2023 12.3     NRBC Automated 07/14/2023 0.0     Seg Neutrophils 07/14/2023 56     Lymphocytes 07/14/2023 34     Monocytes 07/14/2023 8     Eosinophils % 07/14/2023 1     Basophils 07/14/2023 1     Immature Granulocytes 07/14/2023 0     Segs Absolute 07/14/2023 4.89     Absolute Lymph # 07/14/2023 2.96     Absolute Mono # 07/14/2023 0.68     Absolute Eos # 07/14/2023 0.07     Basophils Absolute 07/14/2023 0.08     Absolute Immature Granul* 07/14/2023 0.03     Glucose 07/14/2023 100 (H)     BUN 07/14/2023 17     Creatinine 07/14/2023 1.1 (H)     Est, Glom Filt Rate 07/14/2023 57 (L)     Calcium 07/14/2023 9.5     Sodium 07/14/2023 143     Potassium 07/14/2023 5.0     Chloride 07/14/2023 105     CO2 07/14/2023 21     Anion Gap 07/14/2023 17     Alkaline Phosphatase 07/14/2023 56     ALT 07/14/2023 18     AST 07/14/2023 18     Total Bilirubin 07/14/2023 0.4     Total Protein 07/14/2023 7.0     Albumin 07/14/2023 4.5     Albumin/Globulin Ratio 07/14/2023 1.8     Thyroxine, Free 07/14/2023 1.4     Cholesterol 07/14/2023 125     HDL 07/14/2023 46     LDL Cholesterol 07/14/2023 66     Chol/HDL Ratio 07/14/2023 2.7     Triglycerides 07/14/2023 64     TSH 07/14/2023 1.22         ASSESSMENT/PLAN     1.  Bilateral carotid artery stenosis  Comments:  on

## 2023-09-28 NOTE — PATIENT INSTRUCTIONS
- Call and make a follow-up with vascular surgery about your carotid stenosis. - Labs from 7/14/2023 were reviewed. - I advise trying Mucinex for your current headache. If this does not improve your symptoms after 2 days, let me know and I will send an antibiotic.  - Complete blood work as ordered. - Complete mammogram as ordered. - Influenza and Atuurmc21 vaccines were administered in-office today.

## 2023-10-02 RX ORDER — LISINOPRIL 40 MG/1
40 TABLET ORAL DAILY
Qty: 90 TABLET | Refills: 3 | Status: SHIPPED | OUTPATIENT
Start: 2023-10-02

## 2023-10-02 NOTE — TELEPHONE ENCOUNTER
Mahesh Pinto is calling to request a refill on the following medication(s):    Medication Request:  Requested Prescriptions     Pending Prescriptions Disp Refills    lisinopril (PRINIVIL;ZESTRIL) 40 MG tablet [Pharmacy Med Name: LISINOPRIL TAB 40MG] 90 tablet 3     Sig: TAKE 1 TABLET DAILY       Last Visit Date (If Applicable):  0/07/5652    Next Visit Date:    Visit date not found

## 2023-10-23 ENCOUNTER — HOSPITAL ENCOUNTER (OUTPATIENT)
Dept: MAMMOGRAPHY | Age: 63
Discharge: HOME | End: 2023-10-25
Payer: COMMERCIAL

## 2023-10-23 VITALS — BODY MASS INDEX: 29.16 KG/M2 | WEIGHT: 175 LBS | HEIGHT: 65 IN

## 2023-10-23 DIAGNOSIS — Z12.31 BREAST CANCER SCREENING BY MAMMOGRAM: ICD-10-CM

## 2023-10-23 PROCEDURE — 77063 BREAST TOMOSYNTHESIS BI: CPT

## 2023-11-22 RX ORDER — ERGOCALCIFEROL 1.25 MG/1
CAPSULE ORAL
Qty: 6 CAPSULE | Refills: 0 | Status: SHIPPED | OUTPATIENT
Start: 2023-11-22

## 2023-11-22 NOTE — TELEPHONE ENCOUNTER
Monik De La Cruz is calling to request a refill on the following medication(s):    Medication Request:  Requested Prescriptions     Pending Prescriptions Disp Refills    vitamin D (ERGOCALCIFEROL) 1.25 MG (48475 UT) CAPS capsule [Pharmacy Med Name: VITAMIN D2 CAP 50,000IU] 6 capsule 0     Sig: TAKE 1 CAPSULE EVERY OTHER WEEK (EVERY 2 WEEKS)       Last Visit Date (If Applicable):  7/89/6413    Next Visit Date:    Visit date not found

## 2023-11-22 NOTE — TELEPHONE ENCOUNTER
Shahram Rosas is calling to request a refill on the following medication(s):    Medication Request:  Requested Prescriptions     Pending Prescriptions Disp Refills    vitamin D (ERGOCALCIFEROL) 1.25 MG (31758 UT) CAPS capsule [Pharmacy Med Name: VITAMIN D2 CAP 50,000IU] 6 capsule 0     Sig: TAKE 1 CAPSULE EVERY OTHER WEEK (EVERY 2 WEEKS)       Last Visit Date (If Applicable):  9/17/3646    Next Visit Date:    Visit date not found

## 2024-02-14 RX ORDER — ERGOCALCIFEROL 1.25 MG/1
CAPSULE ORAL
Qty: 6 CAPSULE | Refills: 0 | Status: SHIPPED | OUTPATIENT
Start: 2024-02-14

## 2024-02-14 NOTE — TELEPHONE ENCOUNTER
Enedelia Ramos is calling to request a refill on the following medication(s):    Medication Request:  Requested Prescriptions     Pending Prescriptions Disp Refills    vitamin D (ERGOCALCIFEROL) 1.25 MG (42402 UT) CAPS capsule [Pharmacy Med Name: VITAMIN D2 CAP 50,000IU] 6 capsule 0     Sig: TAKE 1 CAPSULE EVERY OTHER WEEK (EVERY 2 WEEKS)       Last Visit Date (If Applicable):  9/28/2023    Next Visit Date:    Visit date not found

## 2024-02-19 RX ORDER — ATORVASTATIN CALCIUM 80 MG/1
80 TABLET, FILM COATED ORAL DAILY
Qty: 90 TABLET | Refills: 2 | Status: SHIPPED | OUTPATIENT
Start: 2024-02-19

## 2024-02-19 RX ORDER — CARVEDILOL 12.5 MG/1
12.5 TABLET ORAL 2 TIMES DAILY
Qty: 180 TABLET | Refills: 2 | Status: SHIPPED | OUTPATIENT
Start: 2024-02-19

## 2024-02-19 NOTE — TELEPHONE ENCOUNTER
Enedelia Ramos is calling to request a refill on the following medication(s):    Medication Request:  Requested Prescriptions     Pending Prescriptions Disp Refills    carvedilol (COREG) 12.5 MG tablet [Pharmacy Med Name: CARVEDILOL TAB 12.5MG] 180 tablet 2     Sig: TAKE 1 TABLET TWICE A DAY    atorvastatin (LIPITOR) 80 MG tablet [Pharmacy Med Name: ATORVASTATIN TAB 80MG] 90 tablet 2     Sig: TAKE 1 TABLET ONCE DAILY       Last Visit Date (If Applicable):  9/28/2023    Next Visit Date:    Visit date not found

## 2024-03-11 RX ORDER — CLOPIDOGREL BISULFATE 75 MG/1
75 TABLET ORAL DAILY
Qty: 90 TABLET | Refills: 3 | Status: SHIPPED | OUTPATIENT
Start: 2024-03-11

## 2024-03-11 NOTE — TELEPHONE ENCOUNTER
Enedelia Ramos is calling to request a refill on the following medication(s):    Medication Request:  Requested Prescriptions     Pending Prescriptions Disp Refills    clopidogrel (PLAVIX) 75 MG tablet [Pharmacy Med Name: CLOPIDOGREL  TAB 75MG] 90 tablet 3     Sig: TAKE 1 TABLET ONCE DAILY       Last Visit Date (If Applicable):  9/28/2023    Next Visit Date:    Visit date not found

## 2024-04-22 ENCOUNTER — HOSPITAL ENCOUNTER (OUTPATIENT)
Age: 64
Setting detail: SPECIMEN
Discharge: HOME | End: 2024-04-22

## 2024-04-22 DIAGNOSIS — I10 ESSENTIAL HYPERTENSION: ICD-10-CM

## 2024-04-22 DIAGNOSIS — E78.5 HYPERLIPIDEMIA, UNSPECIFIED HYPERLIPIDEMIA TYPE: ICD-10-CM

## 2024-04-22 LAB
ALBUMIN SERPL-MCNC: 4.5 G/DL (ref 3.5–5.2)
ALBUMIN/GLOB SERPL: 2 {RATIO} (ref 1–2.5)
ALP SERPL-CCNC: 52 U/L (ref 35–104)
ALT SERPL-CCNC: 17 U/L (ref 10–35)
ANION GAP SERPL CALCULATED.3IONS-SCNC: 8 MMOL/L (ref 9–16)
AST SERPL-CCNC: 23 U/L (ref 10–35)
BILIRUB SERPL-MCNC: 0.4 MG/DL (ref 0–1.2)
BUN SERPL-MCNC: 21 MG/DL (ref 8–23)
CALCIUM SERPL-MCNC: 9.4 MG/DL (ref 8.6–10.4)
CHLORIDE SERPL-SCNC: 106 MMOL/L (ref 98–107)
CO2 SERPL-SCNC: 28 MMOL/L (ref 20–31)
CREAT SERPL-MCNC: 1 MG/DL (ref 0.5–0.9)
GFR SERPL CREATININE-BSD FRML MDRD: 66 ML/MIN/1.73M2
GLUCOSE SERPL-MCNC: 106 MG/DL (ref 74–99)
POTASSIUM SERPL-SCNC: 4.6 MMOL/L (ref 3.7–5.3)
PROT SERPL-MCNC: 6.7 G/DL (ref 6.6–8.7)
SODIUM SERPL-SCNC: 142 MMOL/L (ref 136–145)

## 2024-05-01 RX ORDER — ERGOCALCIFEROL 1.25 MG/1
CAPSULE ORAL
Qty: 6 CAPSULE | Refills: 0 | OUTPATIENT
Start: 2024-05-01

## 2024-09-16 RX ORDER — LISINOPRIL 40 MG/1
40 TABLET ORAL DAILY
Qty: 90 TABLET | Refills: 3 | Status: SHIPPED | OUTPATIENT
Start: 2024-09-16

## 2024-09-23 RX ORDER — ATORVASTATIN CALCIUM 80 MG/1
80 TABLET, FILM COATED ORAL DAILY
Qty: 90 TABLET | Refills: 2 | Status: SHIPPED | OUTPATIENT
Start: 2024-09-23

## 2024-09-23 RX ORDER — CARVEDILOL 12.5 MG/1
12.5 TABLET ORAL 2 TIMES DAILY
Qty: 180 TABLET | Refills: 2 | Status: SHIPPED | OUTPATIENT
Start: 2024-09-23

## 2024-11-07 ENCOUNTER — TELEPHONE (OUTPATIENT)
Age: 64
End: 2024-11-07

## 2024-11-07 ENCOUNTER — OFFICE VISIT (OUTPATIENT)
Age: 64
End: 2024-11-07

## 2024-11-07 VITALS
DIASTOLIC BLOOD PRESSURE: 92 MMHG | SYSTOLIC BLOOD PRESSURE: 172 MMHG | OXYGEN SATURATION: 97 % | BODY MASS INDEX: 28.72 KG/M2 | HEIGHT: 65 IN | HEART RATE: 57 BPM | WEIGHT: 172.4 LBS | TEMPERATURE: 97.8 F

## 2024-11-07 DIAGNOSIS — R73.01 IFG (IMPAIRED FASTING GLUCOSE): ICD-10-CM

## 2024-11-07 DIAGNOSIS — E78.2 MIXED HYPERLIPIDEMIA: ICD-10-CM

## 2024-11-07 DIAGNOSIS — Z87.891 PERSONAL HISTORY OF TOBACCO USE: ICD-10-CM

## 2024-11-07 DIAGNOSIS — E55.9 VITAMIN D DEFICIENCY: ICD-10-CM

## 2024-11-07 DIAGNOSIS — Z12.31 ENCOUNTER FOR SCREENING MAMMOGRAM FOR MALIGNANT NEOPLASM OF BREAST: ICD-10-CM

## 2024-11-07 DIAGNOSIS — I65.23 BILATERAL CAROTID ARTERY STENOSIS: ICD-10-CM

## 2024-11-07 DIAGNOSIS — J32.9 SINUSITIS, UNSPECIFIED CHRONICITY, UNSPECIFIED LOCATION: ICD-10-CM

## 2024-11-07 DIAGNOSIS — Z23 ENCOUNTER FOR IMMUNIZATION: ICD-10-CM

## 2024-11-07 DIAGNOSIS — Z00.00 WELL ADULT EXAM: Primary | ICD-10-CM

## 2024-11-07 DIAGNOSIS — I10 ESSENTIAL HYPERTENSION: ICD-10-CM

## 2024-11-07 RX ORDER — AZITHROMYCIN 250 MG/1
TABLET, FILM COATED ORAL
Qty: 1 PACKET | Refills: 1 | Status: SHIPPED | OUTPATIENT
Start: 2024-11-07

## 2024-11-07 RX ORDER — ATORVASTATIN CALCIUM 80 MG/1
80 TABLET, FILM COATED ORAL DAILY
Qty: 90 TABLET | Refills: 2 | Status: SHIPPED | OUTPATIENT
Start: 2024-11-07

## 2024-11-07 RX ORDER — ERGOCALCIFEROL 1.25 MG/1
50000 CAPSULE, LIQUID FILLED ORAL WEEKLY
Qty: 6 CAPSULE | Refills: 3 | Status: SHIPPED | OUTPATIENT
Start: 2024-11-07

## 2024-11-07 RX ORDER — EZETIMIBE 10 MG/1
10 TABLET ORAL DAILY
Qty: 90 TABLET | Refills: 3 | Status: SHIPPED | OUTPATIENT
Start: 2024-11-07

## 2024-11-07 RX ORDER — CARVEDILOL 12.5 MG/1
12.5 TABLET ORAL 2 TIMES DAILY
Qty: 180 TABLET | Refills: 2 | Status: SHIPPED | OUTPATIENT
Start: 2024-11-07

## 2024-11-07 RX ORDER — CLOPIDOGREL BISULFATE 75 MG/1
75 TABLET ORAL DAILY
Qty: 90 TABLET | Refills: 3 | Status: SHIPPED | OUTPATIENT
Start: 2024-11-07

## 2024-11-07 RX ORDER — LISINOPRIL 40 MG/1
40 TABLET ORAL DAILY
Qty: 90 TABLET | Refills: 3 | Status: SHIPPED | OUTPATIENT
Start: 2024-11-07

## 2024-11-07 RX ORDER — AZITHROMYCIN 250 MG/1
TABLET, FILM COATED ORAL
Qty: 1 PACKET | Refills: 1 | Status: SHIPPED | OUTPATIENT
Start: 2024-11-07 | End: 2024-11-07

## 2024-11-07 SDOH — ECONOMIC STABILITY: FOOD INSECURITY: WITHIN THE PAST 12 MONTHS, YOU WORRIED THAT YOUR FOOD WOULD RUN OUT BEFORE YOU GOT MONEY TO BUY MORE.: NEVER TRUE

## 2024-11-07 SDOH — ECONOMIC STABILITY: INCOME INSECURITY: HOW HARD IS IT FOR YOU TO PAY FOR THE VERY BASICS LIKE FOOD, HOUSING, MEDICAL CARE, AND HEATING?: NOT HARD AT ALL

## 2024-11-07 SDOH — ECONOMIC STABILITY: FOOD INSECURITY: WITHIN THE PAST 12 MONTHS, THE FOOD YOU BOUGHT JUST DIDN'T LAST AND YOU DIDN'T HAVE MONEY TO GET MORE.: NEVER TRUE

## 2024-11-07 ASSESSMENT — PATIENT HEALTH QUESTIONNAIRE - PHQ9
SUM OF ALL RESPONSES TO PHQ QUESTIONS 1-9: 0
SUM OF ALL RESPONSES TO PHQ QUESTIONS 1-9: 0
5. POOR APPETITE OR OVEREATING: NOT AT ALL
6. FEELING BAD ABOUT YOURSELF - OR THAT YOU ARE A FAILURE OR HAVE LET YOURSELF OR YOUR FAMILY DOWN: NOT AT ALL
9. THOUGHTS THAT YOU WOULD BE BETTER OFF DEAD, OR OF HURTING YOURSELF: NOT AT ALL
3. TROUBLE FALLING OR STAYING ASLEEP: NOT AT ALL
10. IF YOU CHECKED OFF ANY PROBLEMS, HOW DIFFICULT HAVE THESE PROBLEMS MADE IT FOR YOU TO DO YOUR WORK, TAKE CARE OF THINGS AT HOME, OR GET ALONG WITH OTHER PEOPLE: NOT DIFFICULT AT ALL
4. FEELING TIRED OR HAVING LITTLE ENERGY: NOT AT ALL
SUM OF ALL RESPONSES TO PHQ9 QUESTIONS 1 & 2: 0
SUM OF ALL RESPONSES TO PHQ QUESTIONS 1-9: 0
SUM OF ALL RESPONSES TO PHQ QUESTIONS 1-9: 0
8. MOVING OR SPEAKING SO SLOWLY THAT OTHER PEOPLE COULD HAVE NOTICED. OR THE OPPOSITE, BEING SO FIGETY OR RESTLESS THAT YOU HAVE BEEN MOVING AROUND A LOT MORE THAN USUAL: NOT AT ALL
7. TROUBLE CONCENTRATING ON THINGS, SUCH AS READING THE NEWSPAPER OR WATCHING TELEVISION: NOT AT ALL
2. FEELING DOWN, DEPRESSED OR HOPELESS: NOT AT ALL
1. LITTLE INTEREST OR PLEASURE IN DOING THINGS: NOT AT ALL

## 2024-11-07 NOTE — PROGRESS NOTES
Discussed with the patient the current USPSTF guidelines released March 9, 2021 for screening for lung cancer.    For adults aged 50 to 80 years who have a 20 pack-year smoking history and currently smoke or have quit within the past 15 years the grade B recommendation is to:  Screen for lung cancer with low-dose computed tomography (LDCT) every year.  Stop screening once a person has not smoked for 15 years or has a health problem that limits life expectancy or the ability to have lung surgery.    The patient  reports that she has been smoking cigarettes. She started smoking about 43 years ago. She has a 22 pack-year smoking history. She has never used smokeless tobacco.. Discussed with patient the risks and benefits of screening, including over-diagnosis, false positive rate, and total radiation exposure.  The patient currently exhibits no signs or symptoms suggestive of lung cancer.  Discussed with patient the importance of compliance with yearly annual lung cancer screenings and willingness to undergo diagnosis and treatment if screening scan is positive.  In addition, the patient was counseled regarding the importance of remaining smoke free and/or total smoking cessation.    Also reviewed the following if the patient has Medicare that as of February 10, 2022, Medicare only covers LDCT screening in patients aged 50-77 with at least a 20 pack-year smoking history who currently smoke or have quit in the last 15 years  Discussed with the patient the current USPSTF guidelines released March 9, 2021 for screening for lung cancer.    For adults aged 50 to 80 years who have a 20 pack-year smoking history and currently smoke or have quit within the past 15 years the grade B recommendation is to:  Screen for lung cancer with low-dose computed tomography (LDCT) every year.  Stop screening once a person has not smoked for 15 years or has a health problem that limits life expectancy or the ability to have lung

## 2024-11-07 NOTE — PATIENT INSTRUCTIONS
information.           Learning About Lung Cancer Screening  What is screening for lung cancer?     Lung cancer screening is a way to find some lung cancers early, before a person has any symptoms of the cancer.  Lung cancer screening may help those who have the highest risk for lung cancer--people age 50 and older who are or were heavy smokers. For most people, who aren't at increased risk, screening for lung cancer probably isn't helpful.  Screening won't prevent cancer. And it may not find all lung cancers. Lung cancer screening may lower the risk of dying from lung cancer in a small number of people.  How is it done?  Lung cancer screening is done with a low-dose CT (computed tomography) scan. A CT scan uses X-rays, or radiation, to make detailed pictures of your body. Experts recommend that screening be done in medical centers that focus on finding and treating lung cancer.  Who is screening recommended for?  Lung cancer screening is recommended for people age 50 and older who are or were heavy smokers. That means people with a smoking history of at least 20 pack years. A pack year is a way to measure how heavy a smoker you are or were.  To figure out your pack years, multiply how many packs a day on average (assuming 20 cigarettes per pack) you have smoked by how many years you have smoked. For example:  If you smoked 1 pack a day for 20 years, that's 1 times 20. So you have a smoking history of 20 pack years.  If you smoked 2 packs a day for 10 years, that's 2 times 10. So you have a smoking history of 20 pack years.  Experts agree that screening is for people who have a high risk of lung cancer. But experts don't agree on what high risk means. Some say people age 50 or older with at least a 20-pack-year smoking history are high risk. Others say it's people age 55 or older with a 30-pack-year history.  To see if you could benefit from screening, first find out if you are at high risk for lung cancer. Your

## 2024-11-07 NOTE — PROGRESS NOTES
[facility]  [facility] MEDICINE  [address]  [facility] [address]  Dept: [phone]     Date of Visit:  2024  Patient Name: Enedelia Ramos   Patient :  1960     CHIEF COMPLAINT/HPI:     Chief Complaint   Patient presents with    Sinus Problem     Congestion off and on, needs med refills.        HPI      Enedelia Ramos, 64 y.o. presents today for a well adult exam and a follow up of hypertension, hyperlipidemia, bilateral carotid artery stenosis    She did have an US of the carotid arteries on 2023. She is not currently following with vascular surgeon as her physician's office moved to [facility].      She has not yet had a mammogram yet this year.     She is smoking 4 cigarettes a day. She has not had a recent CT of her chest.     She is unsure why her blood pressure is elevated.     She reports sinus headaches and nasal drainage. She reports PND and a mild dry cough. She denies any fevers or chills.     She reports joint and muscluar pain that presented a few days ago. She reports that her toes are cramping at times, but she denies claudication. She feel like she is hydrated well.     She reports a lump in the palm of her right hand that is somewhat painful. She reports a history of a prior removal of a similar cyst on her wrist.     She does have an allergy to Keflex.     She denies, dizziness, syncope, chest pain, shortness of breath, nausea, vomiting, diarrhea, constipation, blood in the stool, edema, claudication, skin changes, vision changes, fever, or chills.    REVIEW OF SYSTEM      Review of Systems:   Constitutional:  Negative for chills, fatigue, fever and unexpected weight change.   Eyes:  Negative for visual disturbance.   Respiratory:  Negative for cough, chest tightness, shortness of breath and wheezing.    Cardiovascular:  Negative for chest pain, palpitations and leg swelling.   Gastrointestinal:  Negative for abdominal distention,

## 2024-11-07 NOTE — PROGRESS NOTES
Vaccine Information Sheet, \"Influenza - Inactivated\"  given to Enedelia Ramos, or parent/legal guardian of  Enedelia Ramos and verbalized understanding.    Patient responses:    Have you ever had a reaction to a flu vaccine? No  Are you able to eat eggs without adverse effects?  Yes  Do you have any current illness?  No  Have you ever had Guillian [place] Syndrome?  No    Flu vaccine given per order. Please see immunization tab.    Right deltoid.

## 2024-11-07 NOTE — PROGRESS NOTES
After obtaining consent, and per orders of Dr. Villarreal, injection of Boostrix given in left deltoid by Cindi Hernandez LPN. Patient tolerated the injection well.

## 2024-11-11 ENCOUNTER — TELEPHONE (OUTPATIENT)
Age: 64
End: 2024-11-11

## 2024-11-11 ENCOUNTER — TELEPHONE (OUTPATIENT)
Dept: VASCULAR SURGERY | Age: 64
End: 2024-11-11

## 2024-11-11 DIAGNOSIS — I65.23 BILATERAL CAROTID ARTERY STENOSIS: Primary | ICD-10-CM

## 2024-11-11 NOTE — TELEPHONE ENCOUNTER
Hina from [facility] called.  They received a referral to see the patient but cannot schedule an appointment for her to be seen until she has a carotid scan.

## 2024-11-18 ENCOUNTER — TELEPHONE (OUTPATIENT)
Dept: VASCULAR SURGERY | Age: 64
End: 2024-11-18

## 2024-11-27 ENCOUNTER — HOSPITAL ENCOUNTER (OUTPATIENT)
Dept: MAMMOGRAPHY | Age: 64
Discharge: HOME | End: 2024-11-29
Attending: FAMILY MEDICINE
Payer: COMMERCIAL

## 2024-11-27 DIAGNOSIS — Z12.31 ENCOUNTER FOR SCREENING MAMMOGRAM FOR MALIGNANT NEOPLASM OF BREAST: ICD-10-CM

## 2024-11-27 PROCEDURE — 77063 BREAST TOMOSYNTHESIS BI: CPT

## 2024-11-29 ENCOUNTER — HOSPITAL ENCOUNTER (OUTPATIENT)
Dept: CT IMAGING | Age: 64
Discharge: HOME | End: 2024-12-01
Attending: FAMILY MEDICINE
Payer: COMMERCIAL

## 2024-11-29 ENCOUNTER — HOSPITAL ENCOUNTER (OUTPATIENT)
Dept: VASCULAR LAB | Age: 64
Discharge: HOME | End: 2024-12-01
Attending: FAMILY MEDICINE
Payer: COMMERCIAL

## 2024-11-29 DIAGNOSIS — I65.23 BILATERAL CAROTID ARTERY STENOSIS: ICD-10-CM

## 2024-11-29 DIAGNOSIS — Z87.891 PERSONAL HISTORY OF TOBACCO USE: ICD-10-CM

## 2024-11-29 PROCEDURE — 93880 EXTRACRANIAL BILAT STUDY: CPT

## 2024-11-29 PROCEDURE — 71271 CT THORAX LUNG CANCER SCR C-: CPT

## 2024-11-30 LAB
VAS LEFT CCA DIST EDV: 22.9 CM/S
VAS LEFT CCA DIST PSV: 97.2 CM/S
VAS LEFT CCA PROX EDV: 19.3 CM/S
VAS LEFT CCA PROX PSV: 103.3 CM/S
VAS LEFT ECA EDV: 17 CM/S
VAS LEFT ECA PSV: 220.2 CM/S
VAS LEFT ICA DIST EDV: 32.4 CM/S
VAS LEFT ICA DIST PSV: 130.1 CM/S
VAS LEFT ICA MID EDV: 37.5 CM/S
VAS LEFT ICA MID PSV: 184.1 CM/S
VAS LEFT ICA PROX EDV: 6.7 CM/S
VAS LEFT ICA PROX PSV: 220.2 CM/S
VAS LEFT ICA/CCA PSV: 2.3 NO UNITS
VAS LEFT VERTEBRAL EDV: 17 CM/S
VAS LEFT VERTEBRAL PSV: 60.7 CM/S
VAS RIGHT CCA DIST EDV: 16.2 CM/S
VAS RIGHT CCA DIST PSV: 105.7 CM/S
VAS RIGHT CCA PROX EDV: 21.7 CM/S
VAS RIGHT CCA PROX PSV: 98.4 CM/S
VAS RIGHT ECA EDV: 4.7 CM/S
VAS RIGHT ECA PSV: 116.1 CM/S
VAS RIGHT ICA DIST EDV: 23 CM/S
VAS RIGHT ICA DIST PSV: 121.6 CM/S
VAS RIGHT ICA PROX EDV: 19.3 CM/S
VAS RIGHT ICA PROX PSV: 97.8 CM/S
VAS RIGHT ICA/CCA PSV: 1.2 NO UNITS
VAS RIGHT VERTEBRAL EDV: 13.9 CM/S
VAS RIGHT VERTEBRAL PSV: 66.8 CM/S

## 2024-11-30 PROCEDURE — 93880 EXTRACRANIAL BILAT STUDY: CPT | Performed by: SURGERY

## 2024-12-02 ENCOUNTER — TELEPHONE (OUTPATIENT)
Age: 64
End: 2024-12-02

## 2024-12-10 ENCOUNTER — NURSE ONLY (OUTPATIENT)
Age: 64
End: 2024-12-10

## 2024-12-10 ENCOUNTER — HOSPITAL ENCOUNTER (OUTPATIENT)
Age: 64
Setting detail: SPECIMEN
Discharge: HOME | End: 2024-12-10

## 2024-12-10 VITALS — DIASTOLIC BLOOD PRESSURE: 64 MMHG | SYSTOLIC BLOOD PRESSURE: 138 MMHG

## 2024-12-10 DIAGNOSIS — Z12.31 ENCOUNTER FOR SCREENING MAMMOGRAM FOR MALIGNANT NEOPLASM OF BREAST: ICD-10-CM

## 2024-12-10 DIAGNOSIS — R73.01 IFG (IMPAIRED FASTING GLUCOSE): ICD-10-CM

## 2024-12-10 DIAGNOSIS — E78.2 MIXED HYPERLIPIDEMIA: ICD-10-CM

## 2024-12-10 DIAGNOSIS — E55.9 VITAMIN D DEFICIENCY: ICD-10-CM

## 2024-12-10 LAB
25(OH)D3 SERPL-MCNC: 39.5 NG/ML (ref 30–100)
ALBUMIN SERPL-MCNC: 4.4 G/DL (ref 3.5–5.2)
ALBUMIN/GLOB SERPL: 2 {RATIO} (ref 1–2.5)
ALP SERPL-CCNC: 58 U/L (ref 35–104)
ALT SERPL-CCNC: 12 U/L (ref 10–35)
ANION GAP SERPL CALCULATED.3IONS-SCNC: 13 MMOL/L (ref 9–16)
AST SERPL-CCNC: 21 U/L (ref 10–35)
BASOPHILS # BLD: 0.09 K/UL (ref 0–0.2)
BASOPHILS NFR BLD: 1 % (ref 0–2)
BILIRUB SERPL-MCNC: 0.2 MG/DL (ref 0–1.2)
BUN SERPL-MCNC: 21 MG/DL (ref 8–23)
CALCIUM SERPL-MCNC: 9.3 MG/DL (ref 8.6–10.4)
CHLORIDE SERPL-SCNC: 100 MMOL/L (ref 98–107)
CHOLEST SERPL-MCNC: 124 MG/DL (ref 0–199)
CHOLESTEROL/HDL RATIO: 3.3
CO2 SERPL-SCNC: 23 MMOL/L (ref 20–31)
CREAT SERPL-MCNC: 1.4 MG/DL (ref 0.6–0.9)
EOSINOPHIL # BLD: 0.11 K/UL (ref 0–0.44)
EOSINOPHILS RELATIVE PERCENT: 1 % (ref 1–4)
ERYTHROCYTE [DISTWIDTH] IN BLOOD BY AUTOMATED COUNT: 13.6 % (ref 11.8–14.4)
EST. AVERAGE GLUCOSE BLD GHB EST-MCNC: 126 MG/DL
GFR, ESTIMATED: 42 ML/MIN/1.73M2
GLUCOSE SERPL-MCNC: 88 MG/DL (ref 74–99)
HBA1C MFR BLD: 6 % (ref 4–6)
HCT VFR BLD AUTO: 38.8 % (ref 36.3–47.1)
HDLC SERPL-MCNC: 38 MG/DL
HGB BLD-MCNC: 12.9 G/DL (ref 11.9–15.1)
IMM GRANULOCYTES # BLD AUTO: <0.03 K/UL (ref 0–0.3)
IMM GRANULOCYTES NFR BLD: 0 %
LDLC SERPL CALC-MCNC: 59 MG/DL (ref 0–100)
LYMPHOCYTES NFR BLD: 4.58 K/UL (ref 1.1–3.7)
LYMPHOCYTES RELATIVE PERCENT: 47 % (ref 24–43)
MCH RBC QN AUTO: 29.9 PG (ref 25.2–33.5)
MCHC RBC AUTO-ENTMCNC: 33.2 G/DL (ref 28.4–34.8)
MCV RBC AUTO: 90 FL (ref 82.6–102.9)
MONOCYTES NFR BLD: 0.73 K/UL (ref 0.1–1.2)
MONOCYTES NFR BLD: 7 % (ref 3–12)
NEUTROPHILS NFR BLD: 44 % (ref 36–65)
NEUTS SEG NFR BLD: 4.34 K/UL (ref 1.5–8.1)
NRBC BLD-RTO: 0 PER 100 WBC
PLATELET # BLD AUTO: 203 K/UL (ref 138–453)
PMV BLD AUTO: 11.6 FL (ref 8.1–13.5)
POTASSIUM SERPL-SCNC: 4.1 MMOL/L (ref 3.7–5.3)
PROT SERPL-MCNC: 6.6 G/DL (ref 6.6–8.7)
RBC # BLD AUTO: 4.31 M/UL (ref 3.95–5.11)
SODIUM SERPL-SCNC: 136 MMOL/L (ref 136–145)
T4 FREE SERPL-MCNC: 1.3 NG/DL (ref 0.92–1.68)
TRIGL SERPL-MCNC: 137 MG/DL
TSH SERPL DL<=0.05 MIU/L-ACNC: 1.38 UIU/ML (ref 0.27–4.2)
VLDLC SERPL CALC-MCNC: 27 MG/DL (ref 1–30)
WBC OTHER # BLD: 9.9 K/UL (ref 3.5–11.3)

## 2024-12-10 NOTE — PROGRESS NOTES
Enedelia Ramos is being seen today for a Blood Pressure Recheck.     Enedelia Ramos was seen 11/7/2024 and her Blood Pressure was: 172/92  BP Readings from Last 1 Encounters:   12/10/24 138/64       Carmen Malik CMA

## 2024-12-11 ENCOUNTER — PATIENT MESSAGE (OUTPATIENT)
Age: 64
End: 2024-12-11

## 2024-12-11 DIAGNOSIS — N17.9 AKI (ACUTE KIDNEY INJURY) (HCC): Primary | ICD-10-CM

## 2024-12-12 ENCOUNTER — TELEPHONE (OUTPATIENT)
Age: 64
End: 2024-12-12

## 2024-12-15 NOTE — TELEPHONE ENCOUNTER
Inform pt her BP was better 138/64, it had been 170.  It is still a little high.  I want her to be careful with caffeine and salt.  Does she have a cuff at home that she can monitor her BP/pulse with and then update me with readings in 2 weeks?  I want to try to keep her BP<130.

## 2024-12-18 NOTE — TELEPHONE ENCOUNTER
Spoke to patient and she verbalized understanding saying she will call with readings in 2 weeks she thinks she does have a BP cuff at home.

## 2025-01-06 ENCOUNTER — INITIAL CONSULT (OUTPATIENT)
Dept: VASCULAR SURGERY | Age: 65
End: 2025-01-06
Payer: COMMERCIAL

## 2025-01-06 VITALS
BODY MASS INDEX: 28.66 KG/M2 | RESPIRATION RATE: 18 BRPM | DIASTOLIC BLOOD PRESSURE: 80 MMHG | SYSTOLIC BLOOD PRESSURE: 188 MMHG | WEIGHT: 172 LBS | HEIGHT: 65 IN | HEART RATE: 63 BPM | OXYGEN SATURATION: 99 %

## 2025-01-06 DIAGNOSIS — I65.23 CAROTID ARTERY STENOSIS WITHOUT CEREBRAL INFARCTION, BILATERAL: Primary | ICD-10-CM

## 2025-01-06 PROCEDURE — 3077F SYST BP >= 140 MM HG: CPT | Performed by: SURGERY

## 2025-01-06 PROCEDURE — 99203 OFFICE O/P NEW LOW 30 MIN: CPT | Performed by: SURGERY

## 2025-01-06 PROCEDURE — 3079F DIAST BP 80-89 MM HG: CPT | Performed by: SURGERY

## 2025-01-06 NOTE — PROGRESS NOTES
PM    HCT 38.8 12/10/2024 02:10 PM    MCV 90.0 12/10/2024 02:10 PM     12/10/2024 02:10 PM     Lab Results   Component Value Date/Time     12/10/2024 02:10 PM    K 4.1 12/10/2024 02:10 PM     12/10/2024 02:10 PM    CO2 23 12/10/2024 02:10 PM    BUN 21 12/10/2024 02:10 PM    CREATININE 1.4 12/10/2024 02:10 PM    GLUCOSE 88 12/10/2024 02:10 PM    CALCIUM 9.3 12/10/2024 02:10 PM     Lab Results   Component Value Date/Time    ALKPHOS 58 12/10/2024 02:10 PM    ALT 12 12/10/2024 02:10 PM    AST 21 12/10/2024 02:10 PM    BILITOT 0.2 12/10/2024 02:10 PM     No results found for: \"LACTA\"  No results found for: \"AMYLASE\"  No results found for: \"LIPASE\"  No results found for: \"INR\"      Assessment:  64 y.o.female with moderate left internal carotid stenosis and history of right CEA    1. Carotid artery stenosis without cerebral infarction, bilateral        Plan:   Follow-up in 1 year with a repeat carotid ultrasound  Continue Lipitor 80 mg and Plavix 75 mg daily.    Orders Placed This Encounter   Procedures    Vascular duplex carotid bilateral           Electronically signed by Arthur Delos Reyes, MD on 1/6/2025 at 9:34 AM     Copy sent to Conchita Cee DO

## 2025-02-27 ENCOUNTER — HOSPITAL ENCOUNTER (OUTPATIENT)
Age: 65
Setting detail: SPECIMEN
Discharge: HOME | End: 2025-02-27

## 2025-02-27 DIAGNOSIS — N17.9 AKI (ACUTE KIDNEY INJURY): ICD-10-CM

## 2025-02-27 LAB
ALBUMIN SERPL-MCNC: 4.5 G/DL (ref 3.5–5.2)
ALBUMIN/GLOB SERPL: 1.8 {RATIO} (ref 1–2.5)
ALP SERPL-CCNC: 59 U/L (ref 35–104)
ALT SERPL-CCNC: 19 U/L (ref 10–35)
ANION GAP SERPL CALCULATED.3IONS-SCNC: 10 MMOL/L (ref 9–16)
AST SERPL-CCNC: 21 U/L (ref 10–35)
BILIRUB SERPL-MCNC: 0.4 MG/DL (ref 0–1.2)
BUN SERPL-MCNC: 20 MG/DL (ref 8–23)
CALCIUM SERPL-MCNC: 9.7 MG/DL (ref 8.6–10.4)
CHLORIDE SERPL-SCNC: 99 MMOL/L (ref 98–107)
CO2 SERPL-SCNC: 28 MMOL/L (ref 20–31)
CREAT SERPL-MCNC: 1.2 MG/DL (ref 0.6–0.9)
GFR, ESTIMATED: 51 ML/MIN/1.73M2
GLUCOSE SERPL-MCNC: 99 MG/DL (ref 74–99)
POTASSIUM SERPL-SCNC: 4.7 MMOL/L (ref 3.7–5.3)
PROT SERPL-MCNC: 7 G/DL (ref 6.6–8.7)
SODIUM SERPL-SCNC: 137 MMOL/L (ref 136–145)

## 2025-03-01 DIAGNOSIS — E55.9 VITAMIN D DEFICIENCY: ICD-10-CM

## 2025-03-03 DIAGNOSIS — E55.9 VITAMIN D DEFICIENCY: ICD-10-CM

## 2025-03-03 RX ORDER — ERGOCALCIFEROL 1.25 MG/1
CAPSULE, LIQUID FILLED ORAL
Qty: 12 CAPSULE | Refills: 1 | Status: SHIPPED | OUTPATIENT
Start: 2025-03-03 | End: 2025-03-04

## 2025-03-03 NOTE — TELEPHONE ENCOUNTER
Enedelia Ramos is calling to request a refill on the following medication(s):    Medication Request:  Requested Prescriptions     Pending Prescriptions Disp Refills    vitamin D (ERGOCALCIFEROL) 1.25 MG (48969 UT) CAPS capsule [Pharmacy Med Name: VITAMIN D2 CAP 50,000IU]  3     Sig: TAKE 1 CAPSULE ONCE WEEKLY       Last Visit Date (If Applicable):  11/7/2024    Next Visit Date:    5/8/2025

## 2025-03-03 NOTE — TELEPHONE ENCOUNTER
Enedelia Ramos is calling to request a refill on the following medication(s):    Medication Request:  Requested Prescriptions     Pending Prescriptions Disp Refills    vitamin D (ERGOCALCIFEROL) 1.25 MG (95646 UT) CAPS capsule [Pharmacy Med Name: VITAMIN D2 CAP 50,000IU]  2     Sig: TAKE 1 CAPSULE ONCE WEEKLY       Last Visit Date (If Applicable):  11/7/2024    Next Visit Date:    5/8/2025

## 2025-03-04 RX ORDER — ERGOCALCIFEROL 1.25 MG/1
CAPSULE, LIQUID FILLED ORAL
Qty: 45 CAPSULE | Refills: 2 | Status: SHIPPED | OUTPATIENT
Start: 2025-03-04

## 2025-05-05 SDOH — ECONOMIC STABILITY: FOOD INSECURITY: WITHIN THE PAST 12 MONTHS, YOU WORRIED THAT YOUR FOOD WOULD RUN OUT BEFORE YOU GOT MONEY TO BUY MORE.: NEVER TRUE

## 2025-05-05 SDOH — ECONOMIC STABILITY: INCOME INSECURITY: IN THE LAST 12 MONTHS, WAS THERE A TIME WHEN YOU WERE NOT ABLE TO PAY THE MORTGAGE OR RENT ON TIME?: NO

## 2025-05-05 SDOH — ECONOMIC STABILITY: FOOD INSECURITY: WITHIN THE PAST 12 MONTHS, THE FOOD YOU BOUGHT JUST DIDN'T LAST AND YOU DIDN'T HAVE MONEY TO GET MORE.: NEVER TRUE

## 2025-05-05 SDOH — ECONOMIC STABILITY: TRANSPORTATION INSECURITY
IN THE PAST 12 MONTHS, HAS THE LACK OF TRANSPORTATION KEPT YOU FROM MEDICAL APPOINTMENTS OR FROM GETTING MEDICATIONS?: NO

## 2025-05-07 NOTE — PATIENT INSTRUCTIONS
Enedelia    Thank you for choosing [facility].  We know you have options when it comes to your healthcare; we appreciate that you chose us. Our goal is to provide exceptional  service and world class care to every patient.  You will be receiving a survey via email or text message asking for your feedback.  Please take a few minutes to share your thoughts about your recent visit. Your comments help us understand what we do well and ways we can improve.  Thank you in advance for your valuable feedback.      Dr. Phil Villarreal, DO        Learning About Lung Cancer Screening  What is screening for lung cancer?     Lung cancer screening is a way to find some lung cancers early, before a person has any symptoms of the cancer.  Lung cancer screening may help those who have the highest risk for lung cancer--people age 50 and older who are or were heavy smokers. For most people, who aren't at increased risk, screening for lung cancer probably isn't helpful.  Screening won't prevent cancer. And it may not find all lung cancers. Lung cancer screening may lower the risk of dying from lung cancer in a small number of people.  How is it done?  Lung cancer screening is done with a low-dose CT (computed tomography) scan. A CT scan uses X-rays, or radiation, to make detailed pictures of your body. Experts recommend that screening be done in medical centers that focus on finding and treating lung cancer.  Who is screening recommended for?  Lung cancer screening is recommended for people age 50 and older who are or were heavy smokers. That means people with a smoking history of at least 20 pack years. A pack year is a way to measure how heavy a smoker you are or were.  To figure out your pack years, multiply how many packs a day on average (assuming 20 cigarettes per pack) you have smoked by how many years you have smoked. For example:  If you smoked 1 pack a day for 20 years, that's 1 times 20. So you have a smoking

## 2025-05-08 ENCOUNTER — OFFICE VISIT (OUTPATIENT)
Age: 65
End: 2025-05-08

## 2025-05-08 VITALS
WEIGHT: 167.6 LBS | DIASTOLIC BLOOD PRESSURE: 60 MMHG | OXYGEN SATURATION: 96 % | TEMPERATURE: 97.9 F | HEART RATE: 55 BPM | BODY MASS INDEX: 27.89 KG/M2 | SYSTOLIC BLOOD PRESSURE: 134 MMHG

## 2025-05-08 DIAGNOSIS — R73.01 IFG (IMPAIRED FASTING GLUCOSE): ICD-10-CM

## 2025-05-08 DIAGNOSIS — Z12.31 BREAST CANCER SCREENING BY MAMMOGRAM: ICD-10-CM

## 2025-05-08 DIAGNOSIS — Z72.0 TOBACCO ABUSE: ICD-10-CM

## 2025-05-08 DIAGNOSIS — Z87.891 PERSONAL HISTORY OF TOBACCO USE: ICD-10-CM

## 2025-05-08 DIAGNOSIS — Z86.79 HISTORY OF CARDIOMYOPATHY: ICD-10-CM

## 2025-05-08 DIAGNOSIS — I65.23 BILATERAL CAROTID ARTERY STENOSIS: ICD-10-CM

## 2025-05-08 DIAGNOSIS — E78.2 MIXED HYPERLIPIDEMIA: ICD-10-CM

## 2025-05-08 DIAGNOSIS — G62.9 NEUROPATHY: ICD-10-CM

## 2025-05-08 DIAGNOSIS — N18.31 CHRONIC RENAL FAILURE, STAGE 3A (HCC): ICD-10-CM

## 2025-05-08 DIAGNOSIS — Z78.0 POSTMENOPAUSAL: ICD-10-CM

## 2025-05-08 DIAGNOSIS — I10 ESSENTIAL HYPERTENSION: Primary | ICD-10-CM

## 2025-05-08 PROBLEM — E11.69 OBESITY, DIABETES, AND HYPERTENSION SYNDROME (HCC): Status: RESOLVED | Noted: 2022-11-18 | Resolved: 2025-05-08

## 2025-05-08 PROBLEM — I15.2 OBESITY, DIABETES, AND HYPERTENSION SYNDROME (HCC): Status: RESOLVED | Noted: 2022-11-18 | Resolved: 2025-05-08

## 2025-05-08 PROBLEM — E11.59 OBESITY, DIABETES, AND HYPERTENSION SYNDROME (HCC): Status: RESOLVED | Noted: 2022-11-18 | Resolved: 2025-05-08

## 2025-05-08 PROBLEM — E66.9 OBESITY, DIABETES, AND HYPERTENSION SYNDROME (HCC): Status: RESOLVED | Noted: 2022-11-18 | Resolved: 2025-05-08

## 2025-05-08 ASSESSMENT — PATIENT HEALTH QUESTIONNAIRE - PHQ9
8. MOVING OR SPEAKING SO SLOWLY THAT OTHER PEOPLE COULD HAVE NOTICED. OR THE OPPOSITE, BEING SO FIGETY OR RESTLESS THAT YOU HAVE BEEN MOVING AROUND A LOT MORE THAN USUAL: NOT AT ALL
SUM OF ALL RESPONSES TO PHQ QUESTIONS 1-9: 3
5. POOR APPETITE OR OVEREATING: NOT AT ALL
4. FEELING TIRED OR HAVING LITTLE ENERGY: NEARLY EVERY DAY
7. TROUBLE CONCENTRATING ON THINGS, SUCH AS READING THE NEWSPAPER OR WATCHING TELEVISION: NOT AT ALL
1. LITTLE INTEREST OR PLEASURE IN DOING THINGS: NOT AT ALL
2. FEELING DOWN, DEPRESSED OR HOPELESS: NOT AT ALL
SUM OF ALL RESPONSES TO PHQ QUESTIONS 1-9: 3
6. FEELING BAD ABOUT YOURSELF - OR THAT YOU ARE A FAILURE OR HAVE LET YOURSELF OR YOUR FAMILY DOWN: NOT AT ALL
9. THOUGHTS THAT YOU WOULD BE BETTER OFF DEAD, OR OF HURTING YOURSELF: NOT AT ALL
10. IF YOU CHECKED OFF ANY PROBLEMS, HOW DIFFICULT HAVE THESE PROBLEMS MADE IT FOR YOU TO DO YOUR WORK, TAKE CARE OF THINGS AT HOME, OR GET ALONG WITH OTHER PEOPLE: NOT DIFFICULT AT ALL
SUM OF ALL RESPONSES TO PHQ QUESTIONS 1-9: 3
SUM OF ALL RESPONSES TO PHQ QUESTIONS 1-9: 3
3. TROUBLE FALLING OR STAYING ASLEEP: NOT AT ALL

## 2025-05-08 NOTE — PROGRESS NOTES
[facility]  [facility] MEDICINE  [address]  [place] [facility] [facility] [address]  Dept: [phone]     Date of Visit:  2025  Patient Name: Enedelia Ramos   Patient :  1960     CHIEF COMPLAINT/HPI:     Chief Complaint   Patient presents with    Follow-up     Discuss sinuses. Discuss pain in left side of neck. Pain in both legs and hips giving a numb feeling.         HPI      Enedelia Ramos, 64 y.o. presents today for a follow up of hypertension, chronic renal failure, hyperlipidemia, bilateral carotid artery stenosis, and history of cardiomyopathy. She saw Dr. Delos Reyes on 2025 and was instructed to continue Lipitor 80mg and Plavix 75mg daily for her carotid artery stenosis. She will follow up in one year with a repeat carotid ultrasound.     She takes lisinopril 40mg and carvedilol 12.5mg twice daily with good control of her blood pressure. She also takes Plavix 75mg daily. She takes Zetia 10mg and Lipitor 80mg for her cholesterol.     She continues to avoid NSAIDs. She takes two Tylenol in the morning for her pain which she does not notice relief from. She reports bilateral foot pain and sees podiatry for callus treatment. She has used Voltaren gel and shoe inserts/cushions in the past without benefit. She notes after being on her feet during the day she is in pain.     She reports she mainly drinks water. She drinks two cups of coffee with sugar free creamer in the morning. She does not drink soda often but if she does it is diet.     She continues to experience nasal congestion and drainage. She is wondering what she can take otc.      She has not seen dermatology.     She reports intermittent numbness in her lower extremities. She also has some low back pain. She denies headaches, dizziness, syncope, chest pain, shortness of breath, nausea, vomiting, diarrhea, constipation, blood in her stool, edema, claudication, skin changes, vision changes, fever, or chills.

## 2025-05-08 NOTE — PROGRESS NOTES
Ear Cerumen Removal Procedure Note    Indication: ear cerumen impaction    Procedure: After placing the patient's head in the appropriate position, the patient's both ear canals was irrigated with the appropriate solution until the majority of the cerumen was flushed out of the canal.  At this point, the procedure was complete.     The patient tolerated the procedure well.    Complications: None

## 2025-05-09 ENCOUNTER — PATIENT MESSAGE (OUTPATIENT)
Age: 65
End: 2025-05-09

## 2025-05-09 NOTE — PROGRESS NOTES
Discussed with the patient the current USPSTF guidelines released March 9, 2021 for screening for lung cancer.    For adults aged 50 to 80 years who have a 20 pack-year smoking history and currently smoke or have quit within the past 15 years the grade B recommendation is to:  Screen for lung cancer with low-dose computed tomography (LDCT) every year.  Stop screening once a person has not smoked for 15 years or has a health problem that limits life expectancy or the ability to have lung surgery.    The patient  reports that she has been smoking cigarettes. She started smoking about 43 years ago. She has a 22.2 pack-year smoking history. She has never used smokeless tobacco.. Discussed with patient the risks and benefits of screening, including over-diagnosis, false positive rate, and total radiation exposure.  The patient currently exhibits no signs or symptoms suggestive of lung cancer.  Discussed with patient the importance of compliance with yearly annual lung cancer screenings and willingness to undergo diagnosis and treatment if screening scan is positive.  In addition, the patient was counseled regarding the importance of remaining smoke free and/or total smoking cessation.    Also reviewed the following if the patient has Medicare that as of February 10, 2022, Medicare only covers LDCT screening in patients aged 50-77 with at least a 20 pack-year smoking history who currently smoke or have quit in the last 15 years

## 2025-05-27 ENCOUNTER — APPOINTMENT (OUTPATIENT)
Dept: CT IMAGING | Age: 65
End: 2025-05-27
Payer: COMMERCIAL

## 2025-05-27 ENCOUNTER — HOSPITAL ENCOUNTER (EMERGENCY)
Age: 65
Discharge: HOME | End: 2025-05-27
Attending: EMERGENCY MEDICINE
Payer: COMMERCIAL

## 2025-05-27 VITALS
HEIGHT: 65 IN | DIASTOLIC BLOOD PRESSURE: 75 MMHG | RESPIRATION RATE: 16 BRPM | WEIGHT: 160 LBS | SYSTOLIC BLOOD PRESSURE: 162 MMHG | TEMPERATURE: 97.3 F | HEART RATE: 74 BPM | OXYGEN SATURATION: 94 % | BODY MASS INDEX: 26.66 KG/M2

## 2025-05-27 DIAGNOSIS — R20.2 PARESTHESIA: Primary | ICD-10-CM

## 2025-05-27 LAB
ANION GAP SERPL CALCULATED.3IONS-SCNC: 17 MMOL/L (ref 9–16)
BACTERIA URNS QL MICRO: ABNORMAL
BASOPHILS # BLD: 0 K/UL (ref 0–0.2)
BASOPHILS NFR BLD: 0 % (ref 0–2)
BILIRUB UR QL STRIP: NEGATIVE
BUN SERPL-MCNC: 20 MG/DL (ref 8–23)
CALCIUM SERPL-MCNC: 9.5 MG/DL (ref 8.6–10.4)
CASTS #/AREA URNS LPF: ABNORMAL /LPF
CASTS #/AREA URNS LPF: ABNORMAL /LPF
CHARACTER UR: ABNORMAL
CHLORIDE SERPL-SCNC: 94 MMOL/L (ref 98–107)
CLARITY UR: CLEAR
CO2 SERPL-SCNC: 22 MMOL/L (ref 20–31)
COLOR UR: YELLOW
CREAT SERPL-MCNC: 1.5 MG/DL (ref 0.6–0.9)
EOSINOPHIL # BLD: 0 K/UL (ref 0–0.4)
EOSINOPHILS RELATIVE PERCENT: 0 % (ref 1–4)
EPI CELLS #/AREA URNS HPF: ABNORMAL /HPF (ref 0–5)
ERYTHROCYTE [DISTWIDTH] IN BLOOD BY AUTOMATED COUNT: 13.3 % (ref 12.5–15.4)
GFR, ESTIMATED: 39 ML/MIN/1.73M2
GLUCOSE SERPL-MCNC: 129 MG/DL (ref 74–99)
GLUCOSE UR STRIP-MCNC: NEGATIVE MG/DL
HCT VFR BLD AUTO: 37.2 % (ref 36–46)
HGB BLD-MCNC: 12 G/DL (ref 12–16)
HGB UR QL STRIP.AUTO: NEGATIVE
KETONES UR STRIP-MCNC: ABNORMAL MG/DL
LEUKOCYTE ESTERASE UR QL STRIP: NEGATIVE
LYMPHOCYTES NFR BLD: 1.8 K/UL (ref 1–4.8)
LYMPHOCYTES RELATIVE PERCENT: 14 % (ref 24–44)
MCH RBC QN AUTO: 29.2 PG (ref 26–34)
MCHC RBC AUTO-ENTMCNC: 32.3 G/DL (ref 31–37)
MCV RBC AUTO: 90.3 FL (ref 80–100)
MONOCYTES NFR BLD: 1.4 K/UL (ref 0.1–1.2)
MONOCYTES NFR BLD: 10 % (ref 2–11)
MUCOUS THREADS URNS QL MICRO: ABNORMAL
NEUTROPHILS NFR BLD: 76 % (ref 36–66)
NEUTS SEG NFR BLD: 10.2 K/UL (ref 1.8–7.7)
NITRITE UR QL STRIP: NEGATIVE
PH UR STRIP: 5.5 [PH] (ref 5–8)
PLATELET # BLD AUTO: 291 K/UL (ref 140–450)
PMV BLD AUTO: 8.9 FL (ref 6–12)
POTASSIUM SERPL-SCNC: 4 MMOL/L (ref 3.7–5.3)
PROT UR STRIP-MCNC: ABNORMAL MG/DL
RBC # BLD AUTO: 4.12 M/UL (ref 4–5.2)
RBC #/AREA URNS HPF: ABNORMAL /HPF (ref 0–2)
SODIUM SERPL-SCNC: 133 MMOL/L (ref 136–145)
SP GR UR STRIP: 1.01 (ref 1–1.03)
UROBILINOGEN UR STRIP-ACNC: NORMAL EU/DL (ref 0–1)
WBC #/AREA URNS HPF: ABNORMAL /HPF (ref 0–5)
WBC OTHER # BLD: 13.4 K/UL (ref 3.5–11)

## 2025-05-27 PROCEDURE — 80048 BASIC METABOLIC PNL TOTAL CA: CPT

## 2025-05-27 PROCEDURE — 6360000002 HC RX W HCPCS: Performed by: EMERGENCY MEDICINE

## 2025-05-27 PROCEDURE — 72131 CT LUMBAR SPINE W/O DYE: CPT

## 2025-05-27 PROCEDURE — 81001 URINALYSIS AUTO W/SCOPE: CPT

## 2025-05-27 PROCEDURE — 99284 EMERGENCY DEPT VISIT MOD MDM: CPT

## 2025-05-27 PROCEDURE — 96372 THER/PROPH/DIAG INJ SC/IM: CPT

## 2025-05-27 PROCEDURE — 36415 COLL VENOUS BLD VENIPUNCTURE: CPT

## 2025-05-27 PROCEDURE — 85025 COMPLETE CBC W/AUTO DIFF WBC: CPT

## 2025-05-27 RX ORDER — METHOCARBAMOL 750 MG/1
750 TABLET, FILM COATED ORAL 4 TIMES DAILY PRN
Qty: 40 TABLET | Refills: 0 | Status: SHIPPED | OUTPATIENT
Start: 2025-05-27 | End: 2025-06-06

## 2025-05-27 RX ORDER — NITROFURANTOIN 25; 75 MG/1; MG/1
100 CAPSULE ORAL 2 TIMES DAILY
Qty: 10 CAPSULE | Refills: 0 | Status: SHIPPED | OUTPATIENT
Start: 2025-05-27 | End: 2025-06-01

## 2025-05-27 RX ORDER — ORPHENADRINE CITRATE 30 MG/ML
60 INJECTION INTRAMUSCULAR; INTRAVENOUS ONCE
Status: COMPLETED | OUTPATIENT
Start: 2025-05-27 | End: 2025-05-27

## 2025-05-27 RX ORDER — DEXAMETHASONE SODIUM PHOSPHATE 10 MG/ML
10 INJECTION, SOLUTION INTRAMUSCULAR; INTRAVENOUS ONCE
Status: COMPLETED | OUTPATIENT
Start: 2025-05-27 | End: 2025-05-27

## 2025-05-27 RX ORDER — KETOROLAC TROMETHAMINE 30 MG/ML
30 INJECTION, SOLUTION INTRAMUSCULAR; INTRAVENOUS ONCE
Status: COMPLETED | OUTPATIENT
Start: 2025-05-27 | End: 2025-05-27

## 2025-05-27 RX ADMIN — ORPHENADRINE CITRATE 60 MG: 60 INJECTION INTRAMUSCULAR; INTRAVENOUS at 10:31

## 2025-05-27 RX ADMIN — KETOROLAC TROMETHAMINE 30 MG: 30 INJECTION INTRAMUSCULAR; INTRAVENOUS at 10:32

## 2025-05-27 RX ADMIN — DEXAMETHASONE SODIUM PHOSPHATE 10 MG: 10 INJECTION, SOLUTION INTRAMUSCULAR; INTRAVENOUS at 10:32

## 2025-05-27 ASSESSMENT — PAIN DESCRIPTION - DESCRIPTORS: DESCRIPTORS: NUMBNESS

## 2025-05-27 ASSESSMENT — PAIN DESCRIPTION - FREQUENCY: FREQUENCY: CONTINUOUS

## 2025-05-27 ASSESSMENT — ENCOUNTER SYMPTOMS
EYE REDNESS: 0
ABDOMINAL PAIN: 0
NAUSEA: 0
CONSTIPATION: 0
DIARRHEA: 0
CHEST TIGHTNESS: 0
VOMITING: 0
COUGH: 0
SHORTNESS OF BREATH: 0

## 2025-05-27 ASSESSMENT — PAIN SCALES - GENERAL
PAINLEVEL_OUTOF10: 9
PAINLEVEL_OUTOF10: 3

## 2025-05-27 ASSESSMENT — PAIN DESCRIPTION - PAIN TYPE: TYPE: ACUTE PAIN

## 2025-05-27 ASSESSMENT — PAIN - FUNCTIONAL ASSESSMENT: PAIN_FUNCTIONAL_ASSESSMENT: NONE - DENIES PAIN

## 2025-05-27 ASSESSMENT — PAIN DESCRIPTION - LOCATION: LOCATION: LEG;BUTTOCKS;FOOT

## 2025-05-27 ASSESSMENT — LIFESTYLE VARIABLES
HOW MANY STANDARD DRINKS CONTAINING ALCOHOL DO YOU HAVE ON A TYPICAL DAY: PATIENT DOES NOT DRINK
HOW OFTEN DO YOU HAVE A DRINK CONTAINING ALCOHOL: NEVER

## 2025-05-27 ASSESSMENT — PAIN DESCRIPTION - ORIENTATION: ORIENTATION: LEFT;RIGHT

## 2025-05-27 NOTE — ED PROVIDER NOTES
[facility] Emergency Department  [address]  [facility] [address]  Phone: [phone]  Fax: [phone]      Patient Name:  Enedelia Ramos  Medical Record Number:  [id number]  YOB: 1960  Date of Service:  5/27/2025    CHIEF COMPLAINT:       Chief Complaint   Patient presents with    Numbness     Bilateral leg. Acute onset at work. From low back into feet       HISTORY OF PRESENT ILLNESS:   Enedelia Ramos is a 64 y.o. female who presents to our emergency department.  This patient presents to the emerged part complaining of paresthesias in bilateral lower extremities.  Right side greater than left.  States that it started when she was at work.  No heavy lifting or bending.  States this started intermittently.  States that she is moving her leg started to feel like they would come back but states that she still had paresthesias.  States that it felt like it was tingling.  States that she felt like her right leg was heavier than left but feels like its improved. No loss of bowel or bladder or saddle anesthesia.        REVIEW OF SYSTEMS:     Review of Systems   Constitutional:  Negative for chills, diaphoresis and fever.   HENT:  Negative for drooling.    Eyes:  Negative for redness.   Respiratory:  Negative for cough, chest tightness and shortness of breath.    Cardiovascular:  Negative for chest pain and palpitations.   Gastrointestinal:  Negative for abdominal pain, constipation, diarrhea, nausea and vomiting.   Genitourinary:  Negative for dysuria, flank pain, frequency and hematuria.   Musculoskeletal:  Negative for neck stiffness.   Skin:  Negative for rash.   Neurological:  Positive for numbness. Negative for weakness and headaches.   Psychiatric/Behavioral:  Negative for agitation.        CURRENT MEDICATIONS:      Previous Medications    ALPRAZOLAM (XANAX) 0.25 MG TABLET    Take 1 tablet by mouth nightly as needed for Sleep.    ASPIRIN 81 MG TABLET    Take 1 tablet by mouth daily     many patient populations.  Rapid screening tests are less sensitive than culture and if UTI is a clinical possibility, culture should be considered despite a negative urinalysis.         RADIOLOGY:   CT LUMBAR SPINE WO CONTRAST  Result Date: 5/27/2025  EXAMINATION: CT OF THE LUMBAR SPINE WITHOUT CONTRAST  5/27/2025 TECHNIQUE: CT of the lumbar spine was performed without the administration of intravenous contrast. Multiplanar reformatted images are provided for review. Adjustment of mA and/or kV according to patient size was utilized.  Automated exposure control, iterative reconstruction, and/or weight based adjustment of the mA/kV was utilized to reduce the radiation dose to as low as reasonably achievable. COMPARISON: None HISTORY: ORDERING SYSTEM PROVIDED HISTORY: low back pain with numbness b/l legs TECHNOLOGIST PROVIDED HISTORY: low back pain with numbness b/l legs Decision Support Exception - unselect if not a suspected or confirmed emergency medical condition->Emergency Medical Condition (MA) Reason for Exam: pt c/o numbness in both legs, started today FINDINGS: BONES/ALIGNMENT: There is normal alignment of the spine. The vertebral body heights are maintained. No osseous destructive lesion is seen. DEGENERATIVE CHANGES: Mild degenerative changes with bridging osteophytes seen at the lower thoracic and upper lumbar spine.  Mild facet arthropathy seen at the lower lumbar spine.  Multilevel disc bulges most pronounced at L4-L5 and L5-S1.  No significant central canal stenosis. SOFT TISSUES/RETROPERITONEUM: No paraspinal mass is seen.     Mild degenerative changes without acute fracture.       CT LUMBAR SPINE WO CONTRAST   Final Result   Mild degenerative changes without acute fracture.             MEDICAL DECISION MAKING:   Complains of paresthesias in lower extremities.  Patient states that it has improved at this time.  No loss of bowel or bladder or saddle anesthesia.  No history of back surgeries.  Patient

## 2025-05-27 NOTE — DISCHARGE INSTRUCTIONS
Call today or tomorrow to follow up with Conchita Villarreal DO  in 1-2 days.    Use an ice pack or bag filled with ice and apply to the injured area 3 - 4 times a day for 15 - 20 minutes each time.  If the injury is older than 3 days, then use a heating pad to help relax the muscles in your back.    Use ibuprofen or Tylenol (unless prescribed medications that have Tylenol in it) for pain.  You can take over the counter Ibuprofen (advil) tablets (4 tablets every 8 hours or 3 tablets every 6 hours or 2 tablets every 4 hours)    Return to the Emergency Department for inability to move legs, worsening of pain, tingling / loss of sensation, any other care or concern.    If you had imaging today, your results are:  CT LUMBAR SPINE WO CONTRAST   Final Result   Mild degenerative changes without acute fracture.             Please understand that at this time there is no evidence for a more serious underlying process, but that early in the process of an illness or injury, an emergency department workup can be falsely reassuring.  You should contact your family doctor within the next 24 hours for a follow up appointment. If you do not have one, we have attached the \"[facility] Same Day\" Physician line for you to call and they can provide you with one ([phone]). .    THANK YOU!    From [facility] and [facility]    On behalf of the Emergency Department staff at [facility], I would like to thank you for giving us the opportunity to address your health care needs and concerns.    We hope that during your visit, our service was delivered in a professional and caring manner. Please keep [facility] in mind as we walk with you down the path to your own personal wellness.     Please understand that early in the process of an illness or injury, an emergency department workup can be falsely reassuring.  If you notice any worsening, changing or persistent symptoms please call your family doctor or return to

## 2025-05-30 ENCOUNTER — TELEPHONE (OUTPATIENT)
Age: 65
End: 2025-05-30

## 2025-05-30 RX ORDER — SULFAMETHOXAZOLE AND TRIMETHOPRIM 800; 160 MG/1; MG/1
1 TABLET ORAL 2 TIMES DAILY
Qty: 6 TABLET | Refills: 0 | Status: SHIPPED | OUTPATIENT
Start: 2025-05-30 | End: 2025-06-02

## 2025-05-30 NOTE — TELEPHONE ENCOUNTER
Patient was in the ER on Tuesday she stated her white blood cells where up and they are treating her for an infection she was put on Macrobid and Methocarbamol she thinks she is having an allergic reaction to the Macrobid as it is causing metallic taste in her mouth and vomiting she is wondering if she can get a different antibiotic    [Well groomed] : well groomed [Cooperative] : cooperative [Depressed] : depressed [Full] : full [Clear] : clear [Linear/Goal Directed] : linear/goal directed [None] : none [None Reported] : none reported [Average] : average [WNL] : within normal limits

## 2025-06-02 ENCOUNTER — PATIENT MESSAGE (OUTPATIENT)
Age: 65
End: 2025-06-02

## 2025-06-02 DIAGNOSIS — R20.0 BILATERAL LEG NUMBNESS: Primary | ICD-10-CM

## 2025-06-05 ENCOUNTER — OFFICE VISIT (OUTPATIENT)
Dept: ORTHOPEDIC SURGERY | Age: 65
End: 2025-06-05
Payer: COMMERCIAL

## 2025-06-05 VITALS — RESPIRATION RATE: 14 BRPM | WEIGHT: 160 LBS | BODY MASS INDEX: 26.66 KG/M2 | HEIGHT: 65 IN

## 2025-06-05 DIAGNOSIS — R20.2 BILATERAL LEG PARESTHESIA: Primary | ICD-10-CM

## 2025-06-05 PROCEDURE — 99213 OFFICE O/P EST LOW 20 MIN: CPT | Performed by: PHYSICIAN ASSISTANT

## 2025-06-05 RX ORDER — PREDNISONE 10 MG/1
TABLET ORAL
Qty: 15 TABLET | Refills: 0 | Status: SHIPPED | OUTPATIENT
Start: 2025-06-05 | End: 2025-06-15

## 2025-06-05 RX ORDER — GABAPENTIN 300 MG/1
300 CAPSULE ORAL 3 TIMES DAILY
Qty: 90 CAPSULE | Refills: 0 | Status: SHIPPED | OUTPATIENT
Start: 2025-06-05 | End: 2025-07-05

## 2025-06-05 NOTE — PROGRESS NOTES
Patient ID: Enedelia Ramos is a 64 y.o. female    Chief Compliant:  Chief Complaint   Patient presents with    New Patient     N.P. bilateral leg numbess, no injury      HPI:  Patient is a 64 y.o. female who presents to the clinic today for evaluation of bilateral leg paresthesias.  Patient reports intermittent paresthesias in her lower extremities for the past month worsening in the last week as she did go to the emergency room on 5/27/2025 for this.  She had a CT scan of her lumbar spine demonstrating some multilevel degenerative changes with facet arthropathy as well as some mild disc bulging at L4-5 L5-S1.  She does not really complain of low back pain she will get it occasionally but not necessarily associated with the symptoms.  She denies any initial injury even though she was at work and started to feel the symptoms.  She denies lifting anything heavy or bending over and feeling any sensations like a pop or click.  She describes a sensation as a tingling initially and has turned into a heavier sensation with numbness associated to her whole legs at times and into her pelvis.  Patient was scheduled to start physical therapy next week.  She has tried taking muscle laxer laxer without much relief.  She does mention intermittent numbness to her genital region that she has felt for when going to the bathroom.  She is currently on antibiotics for a UTI from the emergency room.  Denies weakness in her lower extremities denies previous surgeries to her lumbar spine.        Review of Systems   Constitutional: Negative for fever, chills, sweats.   Eyes: Negative for changes in vision, or pain.   HENT: Negative for ear ache, epistaxis, or sore throat.  Respiratory/Cardio: Negative for Chest pain, palpitations, SOB, or cough.  Gastrointestinal: Negative for abdominal pain, N/V/D.   Genitourinary: Negative for dysuria, frequency, urgency, or hematuria.   Neurological: Negative for headache, numbness, or weakness.

## 2025-06-09 ENCOUNTER — HOSPITAL ENCOUNTER (OUTPATIENT)
Age: 65
Setting detail: THERAPIES SERIES
Discharge: HOME | End: 2025-06-09
Attending: FAMILY MEDICINE
Payer: COMMERCIAL

## 2025-06-09 PROCEDURE — 97161 PT EVAL LOW COMPLEX 20 MIN: CPT

## 2025-06-09 PROCEDURE — 97110 THERAPEUTIC EXERCISES: CPT

## 2025-06-09 NOTE — CONSULTS
[] 3+   Exam (limitations, restrictions) [] 1-2 [x] 3 [] 4+   Clinical presentation (progression) [] Stable [x] Evolving  [] Unstable   Decision Making [] Low [x] Moderate [] High    [x] Low Complexity [] Moderate Complexity [] High Complexity       Treatment Charges: Mins Units Time In Time Out   [x] Evaluation       [x]  Low       []  Moderate       []  High 30 1 15:00 15:30   []  Modalities       [x]  Ther Exercise 10 1 15:30 15:40   [x]  Manual Therapy 5 0 15:40 15:45   []  Ther Activities       []  Aquatics       []  Vasocompression       []  Other         TOTAL BILLABLE TIME: 45    Time in: 15:00      Time out: 15:45    Electronically signed by: Brenda Solorzano PT        Physician Signature:________________________________Date:__________________  By signing above or cosigning this note, I have reviewed this plan of care and certify a need for medically necessary rehabilitation services.     *PLEASE SIGN ABOVE AND FAX BACK ALL PAGES*

## 2025-06-11 ENCOUNTER — HOSPITAL ENCOUNTER (OUTPATIENT)
Age: 65
Setting detail: THERAPIES SERIES
Discharge: HOME | End: 2025-06-11
Attending: FAMILY MEDICINE
Payer: COMMERCIAL

## 2025-06-11 PROCEDURE — 97110 THERAPEUTIC EXERCISES: CPT

## 2025-06-11 NOTE — FLOWSHEET NOTE
[] [facility]  Outpatient Rehabilitation &  Therapy  [address]  [phone]  [phone] [] [facility]  Outpatient Rehabilitation &  Therapy  [address]  P: [phone]  F: [phone] [] [facility]  Outpatient Rehabilitation &  Therapy  [address]  P: [phone]  F: [phone] [] [facility]  Outpatient Rehabilitation &  Therapy  [address]  [phone]  [phone] [] [facility]  Outpatient Rehabilitation &  Therapy  [address]   P: [phone]  F: [phone]  [x] [facility]  Outpatient Rehabilitation &  Therapy  [address]  P: [phone]  F: [phone] [] [facility]  Outpatient Rehabilitation &  Therapy  [address]  P: [phone]  F: [phone] [] [facility]  Outpatient Rehabilitation &  Therapy  [address]  P: [phone]  F: [phone] [] [facility]  Outpatient Rehabilitation &  Therapy  [address]  P: [phone]  F: [phone]  [] [facility] Outpatient Rehabilitation &  Therapy  [address]  P: [phone]  F: [phone]     Physical Therapy Daily Treatment Note    Date:  2025  Patient Name:  Enedelia Ramos    :  1960  MRN: [id number]  Physician: Conchita Villarreal DO                                Insurance: [place], no auth required, 60 visits/yr  Medical Diagnosis: R20.0  Rehab Codes: m54.41, m54.42  Onset Date: 25                 Next 's appt.: 11/10/25  Visit# / total visits: 2/10; Progress note for patient due at visit 10     Cancels/No Shows: 0/0    Subjective:  Pt. Reports slight improved pain in LB. She has been on feet 8 hours at work with some pain with standing during the day in LB but improved with sitting. She is still having numbness

## 2025-06-16 ENCOUNTER — HOSPITAL ENCOUNTER (OUTPATIENT)
Age: 65
Setting detail: THERAPIES SERIES
Discharge: HOME | End: 2025-06-16
Attending: FAMILY MEDICINE
Payer: COMMERCIAL

## 2025-06-16 PROCEDURE — 97110 THERAPEUTIC EXERCISES: CPT

## 2025-06-16 NOTE — FLOWSHEET NOTE
[] [facility]  Outpatient Rehabilitation &  Therapy  [address]  [phone]  [phone] [] [facility]  Outpatient Rehabilitation &  Therapy  [address]  P: [phone]  F: [phone] [] [facility]  Outpatient Rehabilitation &  Therapy  [address]  P: [phone]  F: [phone] [] [facility]  Outpatient Rehabilitation &  Therapy  [address]  [phone]  [phone] [] [facility]  Outpatient Rehabilitation &  Therapy  [address]   P: [phone]  F: [phone]  [x] [facility]  Outpatient Rehabilitation &  Therapy  [address]  P: [phone]  F: [phone] [] [facility]  Outpatient Rehabilitation &  Therapy  [address]  P: [phone]  F: [phone] [] [facility]  Outpatient Rehabilitation &  Therapy  [address]  P: [phone]  F: [phone] [] [facility]  Outpatient Rehabilitation &  Therapy  [address]  P: [phone]  F: [phone]  [] [facility] Outpatient Rehabilitation &  Therapy  [address]  P: [phone]  F: [phone]     Physical Therapy Daily Treatment Note    Date:  2025  Patient Name:  Enedelia Ramos    :  1960  MRN: [id number]  Physician: Conchita Villarreal DO                                Insurance: [place], no auth required, 60 visits/yr  Medical Diagnosis: R20.0  Rehab Codes: m54.41, m54.42  Onset Date: 25                 Next 's appt.: 11/10/25  Visit# / total visits: 3/10; Progress note for patient due at visit 10     Cancels/No Shows: 0/0    Subjective:  Pt. Reports she is still doing okay. Notes a little swelling in her feet over the weekend but no pain really. She is having mild pain in B feet 0-1/10 but has been on her feet all day

## 2025-06-18 ENCOUNTER — HOSPITAL ENCOUNTER (OUTPATIENT)
Age: 65
Setting detail: THERAPIES SERIES
Discharge: HOME | End: 2025-06-18
Attending: FAMILY MEDICINE
Payer: COMMERCIAL

## 2025-06-18 PROCEDURE — 97140 MANUAL THERAPY 1/> REGIONS: CPT

## 2025-06-18 PROCEDURE — 97110 THERAPEUTIC EXERCISES: CPT

## 2025-06-18 NOTE — FLOWSHEET NOTE
some standing ex without increased symptoms. No symptoms upon completion of visit. She did have improved ROM/LBP after manual as well.    [] No change.     [] Other:  [x] Patient would continue to benefit from skilled physical therapy services in order to: improve core stability as well as trial mechanical lumbar traction for decreased LBP/LE symptoms.     LTG: (to be met in 10 treatments)  ? Pain: LB and B LE pain 0-2/10 with all ADL, sleep, work.  ? ROM: Lumbar ext AROM to 30 deg for improved walking and house chores.  ? Strength: Improve abdominal and B hip ext strength to 5/5 MMT to allow standing and walking without LBP and LE symptoms.  ? Function: Improve modified oswestry to less than 20% limitation for improved walking/standing/lifting.  Patient to be independent with home exercise program as demonstrated by performance with correct form without cues.        Patient goals: no pain and numbness    Pt. Education:  [x] Yes  [] No  [x] Reviewed Prior HEP/Ed  Method of Education: [x] Verbal  [x] Demo  [x] Written  Comprehension of Education: prone progression and seated with lumbar support as well as posture with lifting  [x] Verbalizes understanding.  [x] Demonstrates understanding.  [] Needs review.  [x] Demonstrates/verbalizes HEP/Ed previously given.   Access Code: [email or web address]  URL: [email or web address]  Date: 06/18/2025  Prepared by: [email or web address]    Exercises  - Lying Prone  - 3-5 x daily - 7 x weekly - 1 sets - 1 reps - 3-5 min hold  - Prone Press Up On Elbows  - 3-5 x daily - 7 x weekly - 1 sets - 1 reps - 3 min hold  - Prone Press Up  - 3-5 x daily - 7 x weekly - 1-2 sets - 10 reps  - Prone Gluteal Sets  - 1 x daily - 7 x weekly - 1 sets - 10 reps - 5 seconds hold  - Beginner Front Arm Support  - 1 x daily - 7 x weekly - 1-2 sets - 10 reps  - Cat Cow  - 1 x daily - 7 x weekly - 1-2 sets - 10 reps  - Supine Transversus Abdominis Bracing - Hands on Stomach  - 1 x daily - 7 x weekly - 1-2 sets

## 2025-06-25 ENCOUNTER — HOSPITAL ENCOUNTER (OUTPATIENT)
Age: 65
Setting detail: THERAPIES SERIES
Discharge: HOME | End: 2025-06-25
Attending: FAMILY MEDICINE
Payer: COMMERCIAL

## 2025-06-25 PROCEDURE — 97110 THERAPEUTIC EXERCISES: CPT

## 2025-06-25 PROCEDURE — 97140 MANUAL THERAPY 1/> REGIONS: CPT

## 2025-06-25 NOTE — FLOWSHEET NOTE
[] [facility]  Outpatient Rehabilitation &  Therapy  [address]  [phone]  [phone] [] [facility]  Outpatient Rehabilitation &  Therapy  [address]  P: [phone]  F: [phone] [] [facility]  Outpatient Rehabilitation &  Therapy  [address]  P: [phone]  F: [phone] [] [facility]  Outpatient Rehabilitation &  Therapy  [address]  [phone]  [phone] [] [facility]  Outpatient Rehabilitation &  Therapy  [address]   P: [phone]  F: [phone]  [x] [facility]  Outpatient Rehabilitation &  Therapy  [address]  P: [phone]  F: [phone] [] [facility]  Outpatient Rehabilitation &  Therapy  [address]  P: [phone]  F: [phone] [] [facility]  Outpatient Rehabilitation &  Therapy  [address]  P: [phone]  F: [phone] [] [facility]  Outpatient Rehabilitation &  Therapy  [address]  P: [phone]  F: [phone]  [] [facility] Outpatient Rehabilitation &  Therapy  [address]  P: [phone]  F: [phone]     Physical Therapy Daily Treatment Note    Date:  2025  Patient Name:  Enedelia Ramos    :  1960  MRN: [id number]  Physician: Conchita Villarreal DO                                Insurance: [place], no auth required, 60 visits/yr  Medical Diagnosis: R20.0  Rehab Codes: m54.41, m54.42  Onset Date: 25                 Next 's appt.: 11/10/25  Visit# / total visits: 5/10; Progress note for patient due at visit 10     Cancels/No Shows: 0/0    Subjective:  Pt. Reports L sided numbness in LE with lying on L side last night that improved when she rolled to her back. She also had an episode of B LE numbness while standing at work today that

## 2025-06-26 ENCOUNTER — HOSPITAL ENCOUNTER (OUTPATIENT)
Dept: MRI IMAGING | Age: 65
Discharge: HOME | End: 2025-06-28
Payer: COMMERCIAL

## 2025-06-26 DIAGNOSIS — R20.2 BILATERAL LEG PARESTHESIA: ICD-10-CM

## 2025-06-26 PROCEDURE — 72148 MRI LUMBAR SPINE W/O DYE: CPT

## 2025-06-27 ENCOUNTER — HOSPITAL ENCOUNTER (OUTPATIENT)
Age: 65
Setting detail: THERAPIES SERIES
Discharge: HOME | End: 2025-06-27
Attending: FAMILY MEDICINE
Payer: COMMERCIAL

## 2025-06-27 PROCEDURE — 97110 THERAPEUTIC EXERCISES: CPT

## 2025-06-27 NOTE — FLOWSHEET NOTE
[] [facility]  Outpatient Rehabilitation &  Therapy  [address]  [phone]  [phone] [] [facility]  Outpatient Rehabilitation &  Therapy  [address]  P: [phone]  F: [phone] [] [facility]  Outpatient Rehabilitation &  Therapy  [address]  P: [phone]  F: [phone] [] [facility]  Outpatient Rehabilitation &  Therapy  [address]  [phone]  [phone] [] [facility]  Outpatient Rehabilitation &  Therapy  [address]   P: [phone]  F: [phone]  [x] [facility]  Outpatient Rehabilitation &  Therapy  [address]  P: [phone]  F: [phone] [] [facility]  Outpatient Rehabilitation &  Therapy  [address]  P: [phone]  F: [phone] [] [facility]  Outpatient Rehabilitation &  Therapy  [address]  P: [phone]  F: [phone] [] [facility]  Outpatient Rehabilitation &  Therapy  [address]  P: [phone]  F: [phone]  [] [facility] Outpatient Rehabilitation &  Therapy  [address]  P: [phone]  F: [phone]     Physical Therapy Daily Treatment Note    Date:  2025  Patient Name:  Enedelia Ramos    :  1960  MRN: [id number]  Physician: Conchita Villarreal DO                                Insurance: [place], no auth required, 60 visits/yr  Medical Diagnosis: R20.0  Rehab Codes: m54.41, m54.42  Onset Date: 25                 Next 's appt.: 11/10/25  Visit# / total visits: 6/10; Progress note for patient due at visit 10     Cancels/No Shows: 0/0    Subjective:  Pt. Reports no symptoms in LE or pain currently but did have a little bit of numbness in LE yesterday. She did get MRI done but has not gotten results back yet. Will try to perform HEP

## 2025-07-02 DIAGNOSIS — R20.2 BILATERAL LEG PARESTHESIA: Primary | ICD-10-CM

## 2025-07-02 RX ORDER — GABAPENTIN 300 MG/1
CAPSULE ORAL
Qty: 90 CAPSULE | Refills: 0 | Status: SHIPPED | OUTPATIENT
Start: 2025-07-02 | End: 2025-07-30 | Stop reason: SDUPTHER

## 2025-07-07 ENCOUNTER — HOSPITAL ENCOUNTER (OUTPATIENT)
Age: 65
Setting detail: THERAPIES SERIES
Discharge: HOME | End: 2025-07-07
Attending: FAMILY MEDICINE
Payer: COMMERCIAL

## 2025-07-07 PROCEDURE — 97140 MANUAL THERAPY 1/> REGIONS: CPT

## 2025-07-07 PROCEDURE — 97110 THERAPEUTIC EXERCISES: CPT

## 2025-07-07 NOTE — FLOWSHEET NOTE
[] [facility]  Outpatient Rehabilitation &  Therapy  [address]  [phone]  [phone] [] [facility]  Outpatient Rehabilitation &  Therapy  [address]  P: [phone]  F: [phone] [] [facility]  Outpatient Rehabilitation &  Therapy  [address]  P: [phone]  F: [phone] [] [facility]  Outpatient Rehabilitation &  Therapy  [address]  [phone]  [phone] [] [facility]  Outpatient Rehabilitation &  Therapy  [address]   P: [phone]  F: [phone]  [x] [facility]  Outpatient Rehabilitation &  Therapy  [address]  P: [phone]  F: [phone] [] [facility]  Outpatient Rehabilitation &  Therapy  [address]  P: [phone]  F: [phone] [] [facility]  Outpatient Rehabilitation &  Therapy  [address]  P: [phone]  F: [phone] [] [facility]  Outpatient Rehabilitation &  Therapy  [address]  P: [phone]  F: [phone]  [] [facility] Outpatient Rehabilitation &  Therapy  [address]  P: [phone]  F: [phone]     Physical Therapy Daily Treatment Note    Date:  2025  Patient Name:  Enedelia Ramos    :  1960  MRN: [id number]  Physician: Conchita Villarreal DO                                Insurance: [place], no auth required, 60 visits/yr  Medical Diagnosis: R20.0  Rehab Codes: m54.41, m54.42  Onset Date: 25                 Next 's appt.: 11/10/25  Visit# / total visits: 7/10; Progress note for patient due at visit 10     Cancels/No Shows: 0/0    Subjective:  Pt. Reports she will go to ortho this week for MRI follow-up. One report of bladder issues and numbness in saddle region. Pt. Reports less numbness in LE. Compliant with HEP. LBP 0-2/10

## 2025-07-09 ENCOUNTER — APPOINTMENT (OUTPATIENT)
Age: 65
End: 2025-07-09
Attending: FAMILY MEDICINE
Payer: COMMERCIAL

## 2025-07-10 ENCOUNTER — OFFICE VISIT (OUTPATIENT)
Dept: ORTHOPEDIC SURGERY | Age: 65
End: 2025-07-10
Payer: COMMERCIAL

## 2025-07-10 VITALS — RESPIRATION RATE: 14 BRPM | WEIGHT: 160 LBS | BODY MASS INDEX: 26.66 KG/M2 | HEIGHT: 65 IN

## 2025-07-10 DIAGNOSIS — R20.2 BILATERAL LEG PARESTHESIA: Primary | ICD-10-CM

## 2025-07-10 PROCEDURE — 99214 OFFICE O/P EST MOD 30 MIN: CPT | Performed by: PHYSICIAN ASSISTANT

## 2025-07-10 NOTE — PROGRESS NOTES
Behavior: Behavior normal.         Thought Content: Thought content normal.  Skin:     General: Skin is warm and dry.   Musculoskeletal:      Comments: Normal gait      Diagnostic imaging:  MRI of the lumbar spine with multilevel degenerative changes with L3-4 mild canal stenosis as well as minimal stenosis at L4-5.  No high-grade stenosis      Assessment and Plan:  1. Bilateral leg paresthesia      64-year-old female presenting for intermittent bilateral leg paresthesias.  Patient did complete physical therapy which she continues to have intermittent paresthesias at times into her lower extremities.  Her MRI does demonstrate very mild L3-4 canal narrowing without any high-grade stenosis.  We did discuss treatment options such as pain management for lumbar epidural steroid injections distal target L3-4 and L4-5 to see if this improves her symptoms.  We did discuss that this time she is likely not a surgical candidate and are seeking conservative management at this particular time.  She is vies to follow-up in 10 weeks for further evaluation      Follow up 10 weeks with Dr. Myers for further evaluation.            Savannah Do PA-C    7/10/2025 1:31 PM        Please note that this chart was generated using voice recognition Dragon dictation software.  Although every effort was made to ensure the accuracy of this automated transcription, some errors in transcription may have occurred.

## 2025-07-30 ENCOUNTER — INITIAL CONSULT (OUTPATIENT)
Dept: PAIN MANAGEMENT | Age: 65
End: 2025-07-30
Payer: COMMERCIAL

## 2025-07-30 VITALS — WEIGHT: 160 LBS | HEIGHT: 65 IN | BODY MASS INDEX: 26.66 KG/M2

## 2025-07-30 DIAGNOSIS — M54.17 LUMBOSACRAL NEURITIS: Primary | ICD-10-CM

## 2025-07-30 DIAGNOSIS — R20.2 BILATERAL LEG PARESTHESIA: ICD-10-CM

## 2025-07-30 DIAGNOSIS — M47.817 LUMBOSACRAL SPONDYLOSIS WITHOUT MYELOPATHY: ICD-10-CM

## 2025-07-30 PROCEDURE — 99204 OFFICE O/P NEW MOD 45 MIN: CPT | Performed by: PAIN MEDICINE

## 2025-07-30 PROCEDURE — 1123F ACP DISCUSS/DSCN MKR DOCD: CPT | Performed by: PAIN MEDICINE

## 2025-07-30 RX ORDER — AMMONIUM LACTATE 12 G/100G
CREAM TOPICAL
COMMUNITY
Start: 2025-07-21

## 2025-07-30 RX ORDER — GABAPENTIN 300 MG/1
300 CAPSULE ORAL 3 TIMES DAILY
Qty: 90 CAPSULE | Refills: 1 | Status: SHIPPED | OUTPATIENT
Start: 2025-07-30 | End: 2025-08-29

## 2025-07-30 ASSESSMENT — ENCOUNTER SYMPTOMS: BACK PAIN: 1

## 2025-07-30 NOTE — PROGRESS NOTES
HPI:     Leg Pain   The incident occurred more than 1 week ago. There was no injury mechanism. The pain is present in the left leg and right leg. The quality of the pain is described as aching. The pain has been Constant since onset. Associated symptoms include numbness. She reports no foreign bodies present. The symptoms are aggravated by movement and weight bearing. She has tried acetaminophen, elevation, heat, ice, immobilization, non-weight bearing, NSAIDs and rest for the symptoms.   Back Pain  This is a chronic problem. The current episode started more than 1 month ago. The problem occurs constantly. The pain is present in the lumbar spine. The quality of the pain is described as aching. The pain is mild. Associated symptoms include leg pain and numbness.     Physical therapy with no benefit.  She has seen the surgical team.  Nothing surgical planned.  She is on gabapentin 300 mg 3 times daily.  Reports that this does help.    Pain ranges from a 1/10 to a 10/10 depending on activity.    Patient denies any new neurological symptoms. No bowel or bladder incontinence, no weakness, and no falling.    Review of OARRS does not show any aberrant prescription behavior.     Past Medical History:   Diagnosis Date    HTN (hypertension)     Hyperlipidemia     Hypertension     PONV (postoperative nausea and vomiting)     with general anesthesia not MAC       Past Surgical History:   Procedure Laterality Date    BREAST SURGERY Left 1996    MILK DUCT    CARDIAC CATHETERIZATION      CAROTID ENDARTERECTOMY  09/20/2016    right    CHOLECYSTECTOMY      EXCISION / BIOPSY SKIN LESION OF TRUNK N/A 08/31/2017    EXCISIONAL BIOPSY LESIONS ON BACK X4, LEFT AXILLAE X1  performed by TJ Farrar MD at [facility]    FOOT SURGERY Bilateral     HYSTERECTOMY (CERVIX STATUS UNKNOWN)      NC EXC B9 LES MRGN XCP SK TG F/E/E/N/L/M 1.1-2.0CM N/A 05/07/2018    EXCISION LESIONS X2 ON BACK performed by TJ Farrar MD at

## 2025-08-15 ENCOUNTER — HOSPITAL ENCOUNTER (OUTPATIENT)
Age: 65
Setting detail: SPECIMEN
Discharge: HOME | End: 2025-08-15

## 2025-08-15 LAB
ALBUMIN SERPL-MCNC: 4.5 G/DL (ref 3.5–5.2)
ALBUMIN/GLOB SERPL: 1.7 {RATIO} (ref 1–2.5)
ALP SERPL-CCNC: 64 U/L (ref 35–104)
ALT SERPL-CCNC: 16 U/L (ref 10–35)
ANION GAP SERPL CALCULATED.3IONS-SCNC: 9 MMOL/L (ref 9–16)
AST SERPL-CCNC: 22 U/L (ref 10–35)
BILIRUB SERPL-MCNC: 0.4 MG/DL (ref 0–1.2)
BUN SERPL-MCNC: 15 MG/DL (ref 8–23)
CALCIUM SERPL-MCNC: 10.3 MG/DL (ref 8.6–10.4)
CHLORIDE SERPL-SCNC: 102 MMOL/L (ref 98–107)
CHOLEST SERPL-MCNC: 144 MG/DL (ref 0–199)
CHOLESTEROL/HDL RATIO: 2.8
CO2 SERPL-SCNC: 29 MMOL/L (ref 20–31)
CREAT SERPL-MCNC: 1 MG/DL (ref 0.6–0.9)
CRP SERPL HS-MCNC: <3 MG/L (ref 0–5)
ERYTHROCYTE [DISTWIDTH] IN BLOOD BY AUTOMATED COUNT: 13.5 % (ref 11.8–14.4)
ERYTHROCYTE [SEDIMENTATION RATE] IN BLOOD BY PHOTOMETRIC METHOD: 9 MM/HR (ref 0–30)
EST. AVERAGE GLUCOSE BLD GHB EST-MCNC: 120 MG/DL
GFR, ESTIMATED: 63 ML/MIN/1.73M2
GLUCOSE SERPL-MCNC: 99 MG/DL (ref 74–99)
HBA1C MFR BLD: 5.8 % (ref 4–6)
HCT VFR BLD AUTO: 44.6 % (ref 36.3–47.1)
HDLC SERPL-MCNC: 51 MG/DL
HGB BLD-MCNC: 14.4 G/DL (ref 11.9–15.1)
LDLC SERPL CALC-MCNC: 78 MG/DL (ref 0–100)
MCH RBC QN AUTO: 29.4 PG (ref 25.2–33.5)
MCHC RBC AUTO-ENTMCNC: 32.3 G/DL (ref 28.4–34.8)
MCV RBC AUTO: 91 FL (ref 82.6–102.9)
NRBC BLD-RTO: 0 PER 100 WBC
PLATELET # BLD AUTO: 199 K/UL (ref 138–453)
PMV BLD AUTO: 12.5 FL (ref 8.1–13.5)
POTASSIUM SERPL-SCNC: 5.1 MMOL/L (ref 3.7–5.3)
PROT SERPL-MCNC: 7.2 G/DL (ref 6.6–8.7)
RBC # BLD AUTO: 4.9 M/UL (ref 3.95–5.11)
SODIUM SERPL-SCNC: 140 MMOL/L (ref 136–145)
TRIGL SERPL-MCNC: 76 MG/DL
VLDLC SERPL CALC-MCNC: 15 MG/DL (ref 1–30)
WBC OTHER # BLD: 7.1 K/UL (ref 3.5–11.3)

## 2025-08-16 ENCOUNTER — HOSPITAL ENCOUNTER (EMERGENCY)
Age: 65
Discharge: HOME | End: 2025-08-16
Attending: EMERGENCY MEDICINE
Payer: COMMERCIAL

## 2025-08-16 VITALS
WEIGHT: 163 LBS | BODY MASS INDEX: 27.16 KG/M2 | SYSTOLIC BLOOD PRESSURE: 171 MMHG | TEMPERATURE: 97.3 F | HEIGHT: 65 IN | HEART RATE: 54 BPM | DIASTOLIC BLOOD PRESSURE: 56 MMHG | RESPIRATION RATE: 16 BRPM | OXYGEN SATURATION: 94 %

## 2025-08-16 DIAGNOSIS — R20.2 PARESTHESIA: Primary | ICD-10-CM

## 2025-08-16 LAB
ANION GAP SERPL CALCULATED.3IONS-SCNC: 12 MMOL/L (ref 9–16)
BASOPHILS # BLD: 0.1 K/UL (ref 0–0.2)
BASOPHILS NFR BLD: 1 % (ref 0–2)
BILIRUB UR QL STRIP: NEGATIVE
BUN SERPL-MCNC: 16 MG/DL (ref 8–23)
CALCIUM SERPL-MCNC: 9.6 MG/DL (ref 8.6–10.4)
CHLORIDE SERPL-SCNC: 101 MMOL/L (ref 98–107)
CLARITY UR: CLEAR
CO2 SERPL-SCNC: 23 MMOL/L (ref 20–31)
COLOR UR: YELLOW
COMMENT: ABNORMAL
CREAT SERPL-MCNC: 1 MG/DL (ref 0.6–0.9)
EOSINOPHIL # BLD: 0 K/UL (ref 0–0.4)
EOSINOPHILS RELATIVE PERCENT: 0 % (ref 1–4)
ERYTHROCYTE [DISTWIDTH] IN BLOOD BY AUTOMATED COUNT: 14.7 % (ref 12.5–15.4)
GFR, ESTIMATED: 63 ML/MIN/1.73M2
GLUCOSE SERPL-MCNC: 109 MG/DL (ref 74–99)
GLUCOSE UR STRIP-MCNC: NEGATIVE MG/DL
HCT VFR BLD AUTO: 41.4 % (ref 36–46)
HGB BLD-MCNC: 13.6 G/DL (ref 12–16)
HGB UR QL STRIP.AUTO: NEGATIVE
KETONES UR STRIP-MCNC: NEGATIVE MG/DL
LEUKOCYTE ESTERASE UR QL STRIP: NEGATIVE
LYMPHOCYTES NFR BLD: 2.1 K/UL (ref 1–4.8)
LYMPHOCYTES RELATIVE PERCENT: 18 % (ref 24–44)
MCH RBC QN AUTO: 29.8 PG (ref 26–34)
MCHC RBC AUTO-ENTMCNC: 32.9 G/DL (ref 31–37)
MCV RBC AUTO: 90.4 FL (ref 80–100)
MONOCYTES NFR BLD: 0.5 K/UL (ref 0.1–1.2)
MONOCYTES NFR BLD: 4 % (ref 2–11)
NEUTROPHILS NFR BLD: 77 % (ref 36–66)
NEUTS SEG NFR BLD: 8.8 K/UL (ref 1.8–7.7)
NITRITE UR QL STRIP: NEGATIVE
PH UR STRIP: 6 [PH] (ref 5–8)
PLATELET # BLD AUTO: 189 K/UL (ref 140–450)
PMV BLD AUTO: 10 FL (ref 6–12)
POTASSIUM SERPL-SCNC: 4.5 MMOL/L (ref 3.7–5.3)
PROT UR STRIP-MCNC: NEGATIVE MG/DL
RBC # BLD AUTO: 4.58 M/UL (ref 4–5.2)
SODIUM SERPL-SCNC: 136 MMOL/L (ref 136–145)
SP GR UR STRIP: <1.005 (ref 1–1.03)
UROBILINOGEN UR STRIP-ACNC: NORMAL EU/DL (ref 0–1)
WBC OTHER # BLD: 11.5 K/UL (ref 3.5–11)

## 2025-08-16 PROCEDURE — 99284 EMERGENCY DEPT VISIT MOD MDM: CPT

## 2025-08-16 PROCEDURE — 81003 URINALYSIS AUTO W/O SCOPE: CPT

## 2025-08-16 PROCEDURE — 96361 HYDRATE IV INFUSION ADD-ON: CPT

## 2025-08-16 PROCEDURE — 96374 THER/PROPH/DIAG INJ IV PUSH: CPT

## 2025-08-16 PROCEDURE — 6360000002 HC RX W HCPCS: Performed by: EMERGENCY MEDICINE

## 2025-08-16 PROCEDURE — 2580000003 HC RX 258: Performed by: EMERGENCY MEDICINE

## 2025-08-16 PROCEDURE — 85025 COMPLETE CBC W/AUTO DIFF WBC: CPT

## 2025-08-16 PROCEDURE — 80048 BASIC METABOLIC PNL TOTAL CA: CPT

## 2025-08-16 PROCEDURE — 51798 US URINE CAPACITY MEASURE: CPT

## 2025-08-16 RX ORDER — DEXAMETHASONE SODIUM PHOSPHATE 10 MG/ML
10 INJECTION, SOLUTION INTRAMUSCULAR; INTRAVENOUS ONCE
Status: COMPLETED | OUTPATIENT
Start: 2025-08-16 | End: 2025-08-16

## 2025-08-16 RX ORDER — 0.9 % SODIUM CHLORIDE 0.9 %
1000 INTRAVENOUS SOLUTION INTRAVENOUS ONCE
Status: COMPLETED | OUTPATIENT
Start: 2025-08-16 | End: 2025-08-16

## 2025-08-16 RX ADMIN — SODIUM CHLORIDE 1000 ML: 0.9 INJECTION, SOLUTION INTRAVENOUS at 11:22

## 2025-08-16 RX ADMIN — DEXAMETHASONE SODIUM PHOSPHATE 10 MG: 10 INJECTION, SOLUTION INTRAMUSCULAR; INTRAVENOUS at 11:22

## 2025-08-16 ASSESSMENT — LIFESTYLE VARIABLES
HOW OFTEN DO YOU HAVE A DRINK CONTAINING ALCOHOL: MONTHLY OR LESS
HOW MANY STANDARD DRINKS CONTAINING ALCOHOL DO YOU HAVE ON A TYPICAL DAY: 3 OR 4

## 2025-08-16 ASSESSMENT — PAIN DESCRIPTION - PAIN TYPE: TYPE: ACUTE PAIN

## 2025-08-16 ASSESSMENT — PAIN DESCRIPTION - DESCRIPTORS: DESCRIPTORS: ACHING

## 2025-08-16 ASSESSMENT — PAIN - FUNCTIONAL ASSESSMENT: PAIN_FUNCTIONAL_ASSESSMENT: 0-10

## 2025-08-16 ASSESSMENT — PAIN DESCRIPTION - LOCATION: LOCATION: LEG

## 2025-08-16 ASSESSMENT — PAIN DESCRIPTION - ORIENTATION: ORIENTATION: LEFT;RIGHT

## 2025-08-16 ASSESSMENT — PAIN SCALES - GENERAL: PAINLEVEL_OUTOF10: 8

## 2025-08-22 ENCOUNTER — OFFICE VISIT (OUTPATIENT)
Dept: PAIN MANAGEMENT | Age: 65
End: 2025-08-22
Payer: COMMERCIAL

## 2025-08-22 VITALS — BODY MASS INDEX: 27.16 KG/M2 | WEIGHT: 163 LBS | HEIGHT: 65 IN

## 2025-08-22 DIAGNOSIS — M51.369 DEGENERATION OF INTERVERTEBRAL DISC OF LUMBAR REGION, UNSPECIFIED WHETHER PAIN PRESENT: ICD-10-CM

## 2025-08-22 DIAGNOSIS — M47.817 LUMBOSACRAL SPONDYLOSIS WITHOUT MYELOPATHY: ICD-10-CM

## 2025-08-22 DIAGNOSIS — M54.16 LUMBAR RADICULOPATHY: Primary | ICD-10-CM

## 2025-08-22 PROCEDURE — 1123F ACP DISCUSS/DSCN MKR DOCD: CPT | Performed by: STUDENT IN AN ORGANIZED HEALTH CARE EDUCATION/TRAINING PROGRAM

## 2025-08-22 PROCEDURE — 99215 OFFICE O/P EST HI 40 MIN: CPT | Performed by: STUDENT IN AN ORGANIZED HEALTH CARE EDUCATION/TRAINING PROGRAM

## 2025-08-22 RX ORDER — AMLODIPINE BESYLATE 5 MG/1
5 TABLET ORAL DAILY
COMMUNITY
Start: 2025-08-02

## 2025-08-22 RX ORDER — PREDNISONE 10 MG/1
10 TABLET ORAL DAILY
Qty: 10 TABLET | Refills: 0 | Status: SHIPPED | OUTPATIENT
Start: 2025-08-22 | End: 2025-09-01

## 2025-08-22 RX ORDER — FLUTICASONE PROPIONATE 50 MCG
1 SPRAY, SUSPENSION (ML) NASAL DAILY
COMMUNITY
Start: 2025-07-30

## 2025-08-22 RX ORDER — GABAPENTIN 300 MG/1
300 CAPSULE ORAL 3 TIMES DAILY
Qty: 90 CAPSULE | Refills: 1 | Status: CANCELLED | OUTPATIENT
Start: 2025-08-22 | End: 2025-09-21

## 2025-08-22 RX ORDER — GABAPENTIN 600 MG/1
600 TABLET ORAL 3 TIMES DAILY
Qty: 90 TABLET | Refills: 0 | Status: SHIPPED | OUTPATIENT
Start: 2025-08-22 | End: 2025-09-21

## 2025-08-24 ENCOUNTER — PATIENT MESSAGE (OUTPATIENT)
Age: 65
End: 2025-08-24

## 2025-08-25 ENCOUNTER — TELEPHONE (OUTPATIENT)
Dept: PAIN MANAGEMENT | Age: 65
End: 2025-08-25

## 2025-08-27 ENCOUNTER — APPOINTMENT (OUTPATIENT)
Dept: MRI IMAGING | Age: 65
End: 2025-08-27
Payer: COMMERCIAL

## 2025-08-27 ENCOUNTER — HOSPITAL ENCOUNTER (INPATIENT)
Age: 65
LOS: 6 days | Discharge: HOME | DRG: 271 | End: 2025-09-02
Attending: EMERGENCY MEDICINE | Admitting: STUDENT IN AN ORGANIZED HEALTH CARE EDUCATION/TRAINING PROGRAM
Payer: COMMERCIAL

## 2025-08-27 ENCOUNTER — HOSPITAL ENCOUNTER (EMERGENCY)
Age: 65
Discharge: ANOTHER HOSPITAL | End: 2025-08-27
Attending: EMERGENCY MEDICINE
Payer: COMMERCIAL

## 2025-08-27 ENCOUNTER — PATIENT MESSAGE (OUTPATIENT)
Age: 65
End: 2025-08-27

## 2025-08-27 ENCOUNTER — APPOINTMENT (OUTPATIENT)
Dept: CT IMAGING | Age: 65
End: 2025-08-27
Payer: COMMERCIAL

## 2025-08-27 VITALS
WEIGHT: 163 LBS | DIASTOLIC BLOOD PRESSURE: 58 MMHG | BODY MASS INDEX: 27.16 KG/M2 | TEMPERATURE: 97.7 F | OXYGEN SATURATION: 96 % | HEART RATE: 64 BPM | RESPIRATION RATE: 14 BRPM | HEIGHT: 65 IN | SYSTOLIC BLOOD PRESSURE: 190 MMHG

## 2025-08-27 DIAGNOSIS — I74.09 ABDOMINAL AORTA THROMBOSIS (HCC): ICD-10-CM

## 2025-08-27 DIAGNOSIS — L89.152 PRESSURE INJURY OF SACRAL REGION, STAGE 2 (HCC): ICD-10-CM

## 2025-08-27 DIAGNOSIS — Z86.718 HISTORY OF THROMBECTOMY: ICD-10-CM

## 2025-08-27 DIAGNOSIS — I70.1 RENAL ARTERY STENOSIS: ICD-10-CM

## 2025-08-27 DIAGNOSIS — G89.12 ACUTE POST-THORACOTOMY PAIN: ICD-10-CM

## 2025-08-27 DIAGNOSIS — Z98.890 HISTORY OF THROMBECTOMY: ICD-10-CM

## 2025-08-27 DIAGNOSIS — R20.2 PARESTHESIAS: Primary | ICD-10-CM

## 2025-08-27 DIAGNOSIS — I74.10 AORTIC THROMBUS (HCC): Primary | ICD-10-CM

## 2025-08-27 DIAGNOSIS — I73.9 PAD (PERIPHERAL ARTERY DISEASE): ICD-10-CM

## 2025-08-27 DIAGNOSIS — I65.23 CAROTID ARTERY STENOSIS WITHOUT CEREBRAL INFARCTION, BILATERAL: ICD-10-CM

## 2025-08-27 DIAGNOSIS — N28.9 LESION OF RIGHT NATIVE KIDNEY: ICD-10-CM

## 2025-08-27 DIAGNOSIS — I42.9 CARDIOMYOPATHY, UNSPECIFIED TYPE (HCC): ICD-10-CM

## 2025-08-27 DIAGNOSIS — I74.10 THROMBUS OF AORTA (HCC): ICD-10-CM

## 2025-08-27 DIAGNOSIS — R06.02 SHORTNESS OF BREATH: ICD-10-CM

## 2025-08-27 LAB
ANION GAP SERPL CALCULATED.3IONS-SCNC: 14 MMOL/L (ref 9–16)
ANTI-XA UNFRAC HEPARIN: 1.06 IU/L
BASOPHILS # BLD: 0.1 K/UL (ref 0–0.2)
BASOPHILS NFR BLD: 1 % (ref 0–2)
BILIRUB UR QL STRIP: NEGATIVE
BUN SERPL-MCNC: 22 MG/DL (ref 8–23)
CALCIUM SERPL-MCNC: 9.7 MG/DL (ref 8.6–10.4)
CHLORIDE SERPL-SCNC: 101 MMOL/L (ref 98–107)
CK SERPL-CCNC: 198 U/L (ref 26–192)
CLARITY UR: CLEAR
CO2 SERPL-SCNC: 24 MMOL/L (ref 20–31)
COLOR UR: YELLOW
COMMENT: NORMAL
CREAT SERPL-MCNC: 0.9 MG/DL (ref 0.6–0.9)
CRP SERPL HS-MCNC: 5 MG/L (ref 0–5)
EOSINOPHIL # BLD: 0 K/UL (ref 0–0.4)
EOSINOPHILS RELATIVE PERCENT: 0 % (ref 1–4)
ERYTHROCYTE [DISTWIDTH] IN BLOOD BY AUTOMATED COUNT: 14.2 % (ref 12.5–15.4)
ERYTHROCYTE [SEDIMENTATION RATE] IN BLOOD BY PHOTOMETRIC METHOD: 28 MM/HR (ref 0–30)
GFR, ESTIMATED: 71 ML/MIN/1.73M2
GLUCOSE SERPL-MCNC: 127 MG/DL (ref 74–99)
GLUCOSE UR STRIP-MCNC: NEGATIVE MG/DL
HCT VFR BLD AUTO: 38.8 % (ref 36–46)
HGB BLD-MCNC: 13 G/DL (ref 12–16)
HGB UR QL STRIP.AUTO: NEGATIVE
INR PPP: 1.1
KETONES UR STRIP-MCNC: NEGATIVE MG/DL
LEUKOCYTE ESTERASE UR QL STRIP: NEGATIVE
LYMPHOCYTES NFR BLD: 1.6 K/UL (ref 1–4.8)
LYMPHOCYTES RELATIVE PERCENT: 16 % (ref 24–44)
MCH RBC QN AUTO: 30.1 PG (ref 26–34)
MCHC RBC AUTO-ENTMCNC: 33.6 G/DL (ref 31–37)
MCV RBC AUTO: 89.8 FL (ref 80–100)
MONOCYTES NFR BLD: 0.5 K/UL (ref 0.1–1.2)
MONOCYTES NFR BLD: 5 % (ref 2–11)
MYOGLOBIN SERPL-MCNC: 120 NG/ML (ref 25–58)
NEUTROPHILS NFR BLD: 78 % (ref 36–66)
NEUTS SEG NFR BLD: 7.9 K/UL (ref 1.8–7.7)
NITRITE UR QL STRIP: NEGATIVE
PARTIAL THROMBOPLASTIN TIME: >180 SEC (ref 23–36.5)
PH UR STRIP: 6 [PH] (ref 5–8)
PLATELET # BLD AUTO: 263 K/UL (ref 140–450)
PMV BLD AUTO: 9.2 FL (ref 6–12)
POTASSIUM SERPL-SCNC: 4.2 MMOL/L (ref 3.7–5.3)
PROT UR STRIP-MCNC: NEGATIVE MG/DL
PROTHROMBIN TIME: 14.6 SEC (ref 11.7–14.9)
RBC # BLD AUTO: 4.32 M/UL (ref 4–5.2)
SODIUM SERPL-SCNC: 139 MMOL/L (ref 136–145)
SP GR UR STRIP: 1 (ref 1–1.03)
T4 SERPL-MCNC: 8 UG/DL (ref 4.5–11.7)
TSH SERPL DL<=0.05 MIU/L-ACNC: 0.5 UIU/ML (ref 0.27–4.2)
UROBILINOGEN UR STRIP-ACNC: NORMAL EU/DL (ref 0–1)
WBC OTHER # BLD: 10.2 K/UL (ref 3.5–11)

## 2025-08-27 PROCEDURE — 84436 ASSAY OF TOTAL THYROXINE: CPT

## 2025-08-27 PROCEDURE — 70551 MRI BRAIN STEM W/O DYE: CPT

## 2025-08-27 PROCEDURE — 86140 C-REACTIVE PROTEIN: CPT

## 2025-08-27 PROCEDURE — 82550 ASSAY OF CK (CPK): CPT

## 2025-08-27 PROCEDURE — 72141 MRI NECK SPINE W/O DYE: CPT

## 2025-08-27 PROCEDURE — 2500000003 HC RX 250 WO HCPCS: Performed by: EMERGENCY MEDICINE

## 2025-08-27 PROCEDURE — 96372 THER/PROPH/DIAG INJ SC/IM: CPT

## 2025-08-27 PROCEDURE — 80048 BASIC METABOLIC PNL TOTAL CA: CPT

## 2025-08-27 PROCEDURE — 83874 ASSAY OF MYOGLOBIN: CPT

## 2025-08-27 PROCEDURE — 81003 URINALYSIS AUTO W/O SCOPE: CPT

## 2025-08-27 PROCEDURE — 6360000004 HC RX CONTRAST MEDICATION: Performed by: EMERGENCY MEDICINE

## 2025-08-27 PROCEDURE — 72146 MRI CHEST SPINE W/O DYE: CPT

## 2025-08-27 PROCEDURE — 72148 MRI LUMBAR SPINE W/O DYE: CPT

## 2025-08-27 PROCEDURE — 96361 HYDRATE IV INFUSION ADD-ON: CPT

## 2025-08-27 PROCEDURE — 85652 RBC SED RATE AUTOMATED: CPT

## 2025-08-27 PROCEDURE — 99285 EMERGENCY DEPT VISIT HI MDM: CPT

## 2025-08-27 PROCEDURE — 2060000000 HC ICU INTERMEDIATE R&B

## 2025-08-27 PROCEDURE — 96376 TX/PRO/DX INJ SAME DRUG ADON: CPT

## 2025-08-27 PROCEDURE — 85730 THROMBOPLASTIN TIME PARTIAL: CPT

## 2025-08-27 PROCEDURE — 84443 ASSAY THYROID STIM HORMONE: CPT

## 2025-08-27 PROCEDURE — 85610 PROTHROMBIN TIME: CPT

## 2025-08-27 PROCEDURE — 2580000003 HC RX 258: Performed by: NURSE PRACTITIONER

## 2025-08-27 PROCEDURE — 36415 COLL VENOUS BLD VENIPUNCTURE: CPT

## 2025-08-27 PROCEDURE — 75635 CT ANGIO ABDOMINAL ARTERIES: CPT

## 2025-08-27 PROCEDURE — 96365 THER/PROPH/DIAG IV INF INIT: CPT

## 2025-08-27 PROCEDURE — 85025 COMPLETE CBC W/AUTO DIFF WBC: CPT

## 2025-08-27 PROCEDURE — 6360000002 HC RX W HCPCS: Performed by: NURSE PRACTITIONER

## 2025-08-27 PROCEDURE — 96374 THER/PROPH/DIAG INJ IV PUSH: CPT

## 2025-08-27 PROCEDURE — 85520 HEPARIN ASSAY: CPT

## 2025-08-27 RX ORDER — IOPAMIDOL 755 MG/ML
120 INJECTION, SOLUTION INTRAVASCULAR
Status: COMPLETED | OUTPATIENT
Start: 2025-08-27 | End: 2025-08-27

## 2025-08-27 RX ORDER — HEPARIN SODIUM 10000 [USP'U]/100ML
5-30 INJECTION, SOLUTION INTRAVENOUS CONTINUOUS
Status: DISCONTINUED | OUTPATIENT
Start: 2025-08-27 | End: 2025-08-27

## 2025-08-27 RX ORDER — 0.9 % SODIUM CHLORIDE 0.9 %
1000 INTRAVENOUS SOLUTION INTRAVENOUS ONCE
Status: COMPLETED | OUTPATIENT
Start: 2025-08-27 | End: 2025-08-27

## 2025-08-27 RX ORDER — HEPARIN SODIUM 1000 [USP'U]/ML
40 INJECTION, SOLUTION INTRAVENOUS; SUBCUTANEOUS PRN
Status: DISCONTINUED | OUTPATIENT
Start: 2025-08-27 | End: 2025-08-27 | Stop reason: HOSPADM

## 2025-08-27 RX ORDER — SODIUM CHLORIDE 0.9 % (FLUSH) 0.9 %
10 SYRINGE (ML) INJECTION PRN
Status: DISCONTINUED | OUTPATIENT
Start: 2025-08-27 | End: 2025-08-27 | Stop reason: HOSPADM

## 2025-08-27 RX ORDER — LISINOPRIL 10 MG/1
40 TABLET ORAL DAILY
Status: DISCONTINUED | OUTPATIENT
Start: 2025-08-28 | End: 2025-08-30

## 2025-08-27 RX ORDER — ACETAMINOPHEN 650 MG/1
650 SUPPOSITORY RECTAL EVERY 6 HOURS PRN
Status: DISCONTINUED | OUTPATIENT
Start: 2025-08-27 | End: 2025-08-29

## 2025-08-27 RX ORDER — HEPARIN SODIUM 1000 [USP'U]/ML
80 INJECTION, SOLUTION INTRAVENOUS; SUBCUTANEOUS PRN
Status: DISCONTINUED | OUTPATIENT
Start: 2025-08-27 | End: 2025-08-29

## 2025-08-27 RX ORDER — HEPARIN SODIUM 1000 [USP'U]/ML
80 INJECTION, SOLUTION INTRAVENOUS; SUBCUTANEOUS PRN
Status: DISCONTINUED | OUTPATIENT
Start: 2025-08-27 | End: 2025-08-27

## 2025-08-27 RX ORDER — MAGNESIUM SULFATE IN WATER 40 MG/ML
2000 INJECTION, SOLUTION INTRAVENOUS PRN
Status: DISCONTINUED | OUTPATIENT
Start: 2025-08-27 | End: 2025-08-29

## 2025-08-27 RX ORDER — GABAPENTIN 300 MG/1
300 CAPSULE ORAL 3 TIMES DAILY
Status: DISCONTINUED | OUTPATIENT
Start: 2025-08-27 | End: 2025-08-27

## 2025-08-27 RX ORDER — CARVEDILOL 12.5 MG/1
12.5 TABLET ORAL 2 TIMES DAILY
Status: DISCONTINUED | OUTPATIENT
Start: 2025-08-27 | End: 2025-09-01

## 2025-08-27 RX ORDER — HEPARIN SODIUM 1000 [USP'U]/ML
80 INJECTION, SOLUTION INTRAVENOUS; SUBCUTANEOUS ONCE
Status: COMPLETED | OUTPATIENT
Start: 2025-08-27 | End: 2025-08-27

## 2025-08-27 RX ORDER — HEPARIN SODIUM 10000 [USP'U]/100ML
5-30 INJECTION, SOLUTION INTRAVENOUS CONTINUOUS
Status: DISCONTINUED | OUTPATIENT
Start: 2025-08-27 | End: 2025-08-28

## 2025-08-27 RX ORDER — HEPARIN SODIUM 1000 [USP'U]/ML
80 INJECTION, SOLUTION INTRAVENOUS; SUBCUTANEOUS PRN
Status: DISCONTINUED | OUTPATIENT
Start: 2025-08-27 | End: 2025-08-27 | Stop reason: HOSPADM

## 2025-08-27 RX ORDER — EZETIMIBE 10 MG/1
10 TABLET ORAL DAILY
Status: DISCONTINUED | OUTPATIENT
Start: 2025-08-28 | End: 2025-09-02 | Stop reason: HOSPADM

## 2025-08-27 RX ORDER — ATORVASTATIN CALCIUM 80 MG/1
80 TABLET, FILM COATED ORAL DAILY
Status: DISCONTINUED | OUTPATIENT
Start: 2025-08-28 | End: 2025-09-02 | Stop reason: HOSPADM

## 2025-08-27 RX ORDER — POLYETHYLENE GLYCOL 3350 17 G/17G
17 POWDER, FOR SOLUTION ORAL DAILY PRN
Status: DISCONTINUED | OUTPATIENT
Start: 2025-08-27 | End: 2025-08-29

## 2025-08-27 RX ORDER — ONDANSETRON 2 MG/ML
4 INJECTION INTRAMUSCULAR; INTRAVENOUS EVERY 6 HOURS PRN
Status: DISCONTINUED | OUTPATIENT
Start: 2025-08-27 | End: 2025-09-02 | Stop reason: HOSPADM

## 2025-08-27 RX ORDER — DEXAMETHASONE SODIUM PHOSPHATE 10 MG/ML
10 INJECTION, SOLUTION INTRAMUSCULAR; INTRAVENOUS ONCE
Status: COMPLETED | OUTPATIENT
Start: 2025-08-27 | End: 2025-08-27

## 2025-08-27 RX ORDER — HEPARIN SODIUM 1000 [USP'U]/ML
80 INJECTION, SOLUTION INTRAVENOUS; SUBCUTANEOUS ONCE
Status: DISCONTINUED | OUTPATIENT
Start: 2025-08-27 | End: 2025-08-27

## 2025-08-27 RX ORDER — SODIUM CHLORIDE 0.9 % (FLUSH) 0.9 %
10 SYRINGE (ML) INJECTION PRN
Status: DISCONTINUED | OUTPATIENT
Start: 2025-08-27 | End: 2025-09-02 | Stop reason: HOSPADM

## 2025-08-27 RX ORDER — ACETAMINOPHEN 325 MG/1
650 TABLET ORAL EVERY 6 HOURS PRN
Status: DISCONTINUED | OUTPATIENT
Start: 2025-08-27 | End: 2025-08-29

## 2025-08-27 RX ORDER — ONDANSETRON 4 MG/1
4 TABLET, ORALLY DISINTEGRATING ORAL EVERY 8 HOURS PRN
Status: DISCONTINUED | OUTPATIENT
Start: 2025-08-27 | End: 2025-09-02 | Stop reason: HOSPADM

## 2025-08-27 RX ORDER — SODIUM CHLORIDE 0.9 % (FLUSH) 0.9 %
5-40 SYRINGE (ML) INJECTION EVERY 12 HOURS SCHEDULED
Status: DISCONTINUED | OUTPATIENT
Start: 2025-08-27 | End: 2025-09-02 | Stop reason: HOSPADM

## 2025-08-27 RX ORDER — SODIUM CHLORIDE 9 MG/ML
INJECTION, SOLUTION INTRAVENOUS PRN
Status: DISCONTINUED | OUTPATIENT
Start: 2025-08-27 | End: 2025-09-02 | Stop reason: HOSPADM

## 2025-08-27 RX ORDER — 0.9 % SODIUM CHLORIDE 0.9 %
80 INTRAVENOUS SOLUTION INTRAVENOUS ONCE
Status: DISCONTINUED | OUTPATIENT
Start: 2025-08-27 | End: 2025-08-27 | Stop reason: HOSPADM

## 2025-08-27 RX ORDER — HEPARIN SODIUM 1000 [USP'U]/ML
40 INJECTION, SOLUTION INTRAVENOUS; SUBCUTANEOUS PRN
Status: DISCONTINUED | OUTPATIENT
Start: 2025-08-27 | End: 2025-08-29

## 2025-08-27 RX ORDER — HEPARIN SODIUM 1000 [USP'U]/ML
40 INJECTION, SOLUTION INTRAVENOUS; SUBCUTANEOUS PRN
Status: DISCONTINUED | OUTPATIENT
Start: 2025-08-27 | End: 2025-08-27

## 2025-08-27 RX ORDER — HEPARIN SODIUM 10000 [USP'U]/100ML
5-30 INJECTION, SOLUTION INTRAVENOUS CONTINUOUS
Status: DISCONTINUED | OUTPATIENT
Start: 2025-08-27 | End: 2025-08-27 | Stop reason: HOSPADM

## 2025-08-27 RX ORDER — POTASSIUM CHLORIDE 1500 MG/1
40 TABLET, EXTENDED RELEASE ORAL PRN
Status: DISCONTINUED | OUTPATIENT
Start: 2025-08-27 | End: 2025-08-29

## 2025-08-27 RX ORDER — POTASSIUM CHLORIDE 7.45 MG/ML
10 INJECTION INTRAVENOUS PRN
Status: DISCONTINUED | OUTPATIENT
Start: 2025-08-27 | End: 2025-08-29

## 2025-08-27 RX ADMIN — DEXAMETHASONE SODIUM PHOSPHATE 10 MG: 10 INJECTION, SOLUTION INTRAMUSCULAR; INTRAVENOUS at 10:31

## 2025-08-27 RX ADMIN — SODIUM CHLORIDE 1000 ML: 0.9 INJECTION, SOLUTION INTRAVENOUS at 14:24

## 2025-08-27 RX ADMIN — HEPARIN SODIUM AND DEXTROSE 18 UNITS/KG/HR: 10000; 5 INJECTION INTRAVENOUS at 18:38

## 2025-08-27 RX ADMIN — Medication 80 ML: at 14:48

## 2025-08-27 RX ADMIN — HEPARIN SODIUM 5900 UNITS: 1000 INJECTION, SOLUTION INTRAVENOUS; SUBCUTANEOUS at 18:37

## 2025-08-27 RX ADMIN — SODIUM CHLORIDE, PRESERVATIVE FREE 10 ML: 5 INJECTION INTRAVENOUS at 14:43

## 2025-08-27 RX ADMIN — IOPAMIDOL 120 ML: 755 INJECTION, SOLUTION INTRAVENOUS at 14:43

## 2025-08-27 ASSESSMENT — PAIN SCALES - GENERAL
PAINLEVEL_OUTOF10: 10
PAINLEVEL_OUTOF10: 0

## 2025-08-27 ASSESSMENT — PAIN - FUNCTIONAL ASSESSMENT: PAIN_FUNCTIONAL_ASSESSMENT: 0-10

## 2025-08-27 ASSESSMENT — PAIN DESCRIPTION - LOCATION: LOCATION: BACK

## 2025-08-28 ENCOUNTER — APPOINTMENT (OUTPATIENT)
Dept: VASCULAR LAB | Age: 65
DRG: 271 | End: 2025-08-28
Attending: STUDENT IN AN ORGANIZED HEALTH CARE EDUCATION/TRAINING PROGRAM
Payer: COMMERCIAL

## 2025-08-28 ENCOUNTER — APPOINTMENT (OUTPATIENT)
Age: 65
DRG: 271 | End: 2025-08-28
Attending: STUDENT IN AN ORGANIZED HEALTH CARE EDUCATION/TRAINING PROGRAM
Payer: COMMERCIAL

## 2025-08-28 ENCOUNTER — TELEPHONE (OUTPATIENT)
Age: 65
End: 2025-08-28

## 2025-08-28 ENCOUNTER — APPOINTMENT (OUTPATIENT)
Dept: VASCULAR LAB | Age: 65
DRG: 271 | End: 2025-08-28
Payer: COMMERCIAL

## 2025-08-28 ENCOUNTER — APPOINTMENT (OUTPATIENT)
Dept: CT IMAGING | Age: 65
DRG: 271 | End: 2025-08-28
Payer: COMMERCIAL

## 2025-08-28 PROBLEM — I74.09 ABDOMINAL AORTA THROMBOSIS (HCC): Status: ACTIVE | Noted: 2025-08-28

## 2025-08-28 PROBLEM — M47.812 CERVICAL SPONDYLOSIS: Status: ACTIVE | Noted: 2025-08-28

## 2025-08-28 PROBLEM — I74.10 THROMBUS OF AORTA (HCC): Status: ACTIVE | Noted: 2025-08-28

## 2025-08-28 PROBLEM — Q28.3 CAVERNOUS MALFORMATION: Status: ACTIVE | Noted: 2025-08-28

## 2025-08-28 LAB
ANION GAP SERPL CALCULATED.3IONS-SCNC: 15 MMOL/L (ref 9–16)
BUN SERPL-MCNC: 20 MG/DL (ref 8–23)
CALCIUM SERPL-MCNC: 10 MG/DL (ref 8.6–10.4)
CHLORIDE SERPL-SCNC: 103 MMOL/L (ref 98–107)
CO2 SERPL-SCNC: 22 MMOL/L (ref 20–31)
CREAT SERPL-MCNC: 0.9 MG/DL (ref 0.6–0.9)
ECHO AO ASC DIAM: 2.9 CM
ECHO AO ASCENDING AORTA INDEX: 1.6 CM/M2
ECHO AO ROOT DIAM: 2.8 CM
ECHO AO ROOT INDEX: 1.55 CM/M2
ECHO AV AREA PEAK VELOCITY: 2.1 CM2
ECHO AV AREA VTI: 2.1 CM2
ECHO AV AREA/BSA PEAK VELOCITY: 1.2 CM2/M2
ECHO AV AREA/BSA VTI: 1.2 CM2/M2
ECHO AV MEAN GRADIENT: 4 MMHG
ECHO AV MEAN VELOCITY: 0.9 M/S
ECHO AV PEAK GRADIENT: 9 MMHG
ECHO AV PEAK VELOCITY: 1.5 M/S
ECHO AV VELOCITY RATIO: 0.8
ECHO AV VTI: 33 CM
ECHO BSA: 1.84 M2
ECHO EST RA PRESSURE: 3 MMHG
ECHO LA AREA 2C: 15.7 CM2
ECHO LA AREA 4C: 15.8 CM2
ECHO LA DIAMETER INDEX: 2.04 CM/M2
ECHO LA DIAMETER: 3.7 CM
ECHO LA MAJOR AXIS: 5.7 CM
ECHO LA MINOR AXIS: 5.3 CM
ECHO LA TO AORTIC ROOT RATIO: 1.32
ECHO LA VOL BP: 38 ML (ref 22–52)
ECHO LA VOL MOD A2C: 38 ML (ref 22–52)
ECHO LA VOL MOD A4C: 36 ML (ref 22–52)
ECHO LA VOL/BSA BIPLANE: 21 ML/M2 (ref 16–34)
ECHO LA VOLUME INDEX MOD A2C: 21 ML/M2 (ref 16–34)
ECHO LA VOLUME INDEX MOD A4C: 20 ML/M2 (ref 16–34)
ECHO LV E' LATERAL VELOCITY: 9.57 CM/S
ECHO LV E' SEPTAL VELOCITY: 10.8 CM/S
ECHO LV EDV 3D: 90 ML
ECHO LV EDV INDEX 3D: 50 ML/M2
ECHO LV EF PHYSICIAN: 65 %
ECHO LV EJECTION FRACTION 3D: 65 %
ECHO LV ESV 3D: 31 ML
ECHO LV ESV INDEX 3D: 17 ML/M2
ECHO LV FRACTIONAL SHORTENING: 16 % (ref 28–44)
ECHO LV INTERNAL DIMENSION DIASTOLE INDEX: 2.43 CM/M2
ECHO LV INTERNAL DIMENSION DIASTOLIC: 4.4 CM (ref 3.9–5.3)
ECHO LV INTERNAL DIMENSION SYSTOLIC INDEX: 2.04 CM/M2
ECHO LV INTERNAL DIMENSION SYSTOLIC: 3.7 CM
ECHO LV IVSD: 1 CM (ref 0.6–0.9)
ECHO LV MASS 2D: 137.8 G (ref 67–162)
ECHO LV MASS 3D INDEX: 71.3 G/M2
ECHO LV MASS 3D: 129 G
ECHO LV MASS INDEX 2D: 76.1 G/M2 (ref 43–95)
ECHO LV POSTERIOR WALL DIASTOLIC: 0.9 CM (ref 0.6–0.9)
ECHO LV RELATIVE WALL THICKNESS RATIO: 0.41
ECHO LVOT AREA: 2.5 CM2
ECHO LVOT AV VTI INDEX: 0.81
ECHO LVOT DIAM: 1.8 CM
ECHO LVOT MEAN GRADIENT: 3 MMHG
ECHO LVOT PEAK GRADIENT: 6 MMHG
ECHO LVOT PEAK VELOCITY: 1.2 M/S
ECHO LVOT STROKE VOLUME INDEX: 37.7 ML/M2
ECHO LVOT SV: 68.2 ML
ECHO LVOT VTI: 26.8 CM
ECHO MV A VELOCITY: 0.76 M/S
ECHO MV AREA VTI: 1.6 CM2
ECHO MV E DECELERATION TIME (DT): 222 MS
ECHO MV E VELOCITY: 0.87 M/S
ECHO MV E/A RATIO: 1.14
ECHO MV E/E' LATERAL: 9.09
ECHO MV E/E' RATIO (AVERAGED): 8.57
ECHO MV E/E' SEPTAL: 8.06
ECHO MV LVOT VTI INDEX: 1.61
ECHO MV MAX VELOCITY: 1.1 M/S
ECHO MV MEAN GRADIENT: 1 MMHG
ECHO MV MEAN VELOCITY: 0.6 M/S
ECHO MV PEAK GRADIENT: 4 MMHG
ECHO MV VTI: 43.2 CM
ECHO PV MAX VELOCITY: 1.1 M/S
ECHO PV PEAK GRADIENT: 5 MMHG
ECHO RIGHT VENTRICULAR SYSTOLIC PRESSURE (RVSP): 41 MMHG
ECHO RV BASAL DIMENSION: 3.1 CM
ECHO RV FREE WALL PEAK S': 10.9 CM/S
ECHO RV TAPSE: 2 CM (ref 1.7–?)
ECHO TV REGURGITANT MAX VELOCITY: 3.07 M/S
ECHO TV REGURGITANT PEAK GRADIENT: 38 MMHG
ERYTHROCYTE [DISTWIDTH] IN BLOOD BY AUTOMATED COUNT: 13.2 % (ref 11.8–14.4)
GFR, ESTIMATED: 71 ML/MIN/1.73M2
GLUCOSE SERPL-MCNC: 117 MG/DL (ref 74–99)
HCT VFR BLD AUTO: 40.3 % (ref 36.3–47.1)
HGB BLD-MCNC: 13.3 G/DL (ref 11.9–15.1)
INR PPP: 1
MCH RBC QN AUTO: 29.4 PG (ref 25.2–33.5)
MCHC RBC AUTO-ENTMCNC: 33 G/DL (ref 28.4–34.8)
MCV RBC AUTO: 89 FL (ref 82.6–102.9)
NRBC BLD-RTO: 0 PER 100 WBC
PARTIAL THROMBOPLASTIN TIME: 141.3 SEC (ref 23–36.5)
PARTIAL THROMBOPLASTIN TIME: 75.3 SEC (ref 23–36.5)
PARTIAL THROMBOPLASTIN TIME: 80.2 SEC (ref 23–36.5)
PARTIAL THROMBOPLASTIN TIME: 83.8 SEC (ref 23–36.5)
PLATELET # BLD AUTO: 268 K/UL (ref 138–453)
PMV BLD AUTO: 11.3 FL (ref 8.1–13.5)
POTASSIUM SERPL-SCNC: 4.1 MMOL/L (ref 3.7–5.3)
PROTHROMBIN TIME: 13.8 SEC (ref 11.7–14.9)
RBC # BLD AUTO: 4.53 M/UL (ref 3.95–5.11)
SODIUM SERPL-SCNC: 140 MMOL/L (ref 136–145)
VAS LEFT ABI: 0.16
VAS LEFT ARM BP: 161 MMHG
VAS LEFT ATA MID PSV: 6.9 CM/S
VAS LEFT CCA DIST EDV: 32.3 CM/S
VAS LEFT CCA DIST PSV: 123 CM/S
VAS LEFT CCA MID EDV: 19.9 CM/S
VAS LEFT CCA MID PSV: 140 CM/S
VAS LEFT CCA PROX EDV: 24.2 CM/S
VAS LEFT CCA PROX PSV: 124 CM/S
VAS LEFT CFA PROX PSV: 41.2 CM/S
VAS LEFT DORSALIS PEDIS BP: 26 MMHG
VAS LEFT ECA EDV: 19.1 CM/S
VAS LEFT ECA PSV: 338 CM/S
VAS LEFT ICA DIST EDV: 23.6 CM/S
VAS LEFT ICA DIST PSV: 109 CM/S
VAS LEFT ICA MID EDV: 32.1 CM/S
VAS LEFT ICA MID PSV: 146 CM/S
VAS LEFT ICA PROX EDV: 61.7 CM/S
VAS LEFT ICA PROX PSV: 255 CM/S
VAS LEFT ICA/CCA PSV: 2.07
VAS LEFT PERONEAL MID PSV: 12 CM/S
VAS LEFT PFA PROX PSV: 28.8 CM/S
VAS LEFT POP A DIST PSV: 13.6 CM/S
VAS LEFT POP A PROX PSV: 20.1 CM/S
VAS LEFT POP A PROX VEL RATIO: 1.2
VAS LEFT PTA BP: 21 MMHG
VAS LEFT PTA MID PSV: 11 CM/S
VAS LEFT SFA DIST PSV: 16.8 CM/S
VAS LEFT SFA DIST VEL RATIO: 0.38
VAS LEFT SFA MID PSV: 44.7 CM/S
VAS LEFT SFA MID VEL RATIO: 1.09
VAS LEFT SFA PROX PSV: 41 CM/S
VAS LEFT SFA PROX VEL RATIO: 1
VAS LEFT VERTEBRAL EDV: 14.4 CM/S
VAS LEFT VERTEBRAL PSV: 64.5 CM/S
VAS RIGHT ABI: 0.21
VAS RIGHT ARM BP: 163 MMHG
VAS RIGHT ATA MID PSV: 18.7 CM/S
VAS RIGHT CCA DIST EDV: 17.2 CM/S
VAS RIGHT CCA DIST PSV: 140 CM/S
VAS RIGHT CCA MID EDV: 27.4 CM/S
VAS RIGHT CCA MID PSV: 154 CM/S
VAS RIGHT CCA PROX EDV: 21.2 CM/S
VAS RIGHT CCA PROX PSV: 169 CM/S
VAS RIGHT CFA PROX PSV: 85.1 CM/S
VAS RIGHT DORSALIS PEDIS BP: 34 MMHG
VAS RIGHT ECA EDV: 11 CM/S
VAS RIGHT ECA PSV: 158 CM/S
VAS RIGHT ICA DIST EDV: 27.4 CM/S
VAS RIGHT ICA DIST PSV: 127 CM/S
VAS RIGHT ICA MID EDV: 28.2 CM/S
VAS RIGHT ICA MID PSV: 139 CM/S
VAS RIGHT ICA PROX EDV: 26.7 CM/S
VAS RIGHT ICA PROX PSV: 125 CM/S
VAS RIGHT ICA/CCA PSV: 0.99
VAS RIGHT PERONEAL MID PSV: 9.7 CM/S
VAS RIGHT PFA PROX PSV: 54 CM/S
VAS RIGHT POP A DIST PSV: 21.3 CM/S
VAS RIGHT POP A PROX PSV: 20.5 CM/S
VAS RIGHT POP A PROX VEL RATIO: 0.73
VAS RIGHT PTA BP: 35 MMHG
VAS RIGHT PTA MID PSV: 19.7 CM/S
VAS RIGHT SFA DIST PSV: 28.2 CM/S
VAS RIGHT SFA DIST VEL RATIO: 0.7
VAS RIGHT SFA MID PSV: 40.4 CM/S
VAS RIGHT SFA MID VEL RATIO: 0.8
VAS RIGHT SFA PROX PSV: 47.9 CM/S
VAS RIGHT SFA PROX VEL RATIO: 0.6
VAS RIGHT TBI: 0.13
VAS RIGHT TOE PRESSURE: 22 MMHG
VAS RIGHT VERTEBRAL EDV: 11.8 CM/S
VAS RIGHT VERTEBRAL PSV: 77.6 CM/S
WBC OTHER # BLD: 14.3 K/UL (ref 3.5–11.3)

## 2025-08-28 PROCEDURE — 99221 1ST HOSP IP/OBS SF/LOW 40: CPT | Performed by: PSYCHIATRY & NEUROLOGY

## 2025-08-28 PROCEDURE — 99223 1ST HOSP IP/OBS HIGH 75: CPT | Performed by: STUDENT IN AN ORGANIZED HEALTH CARE EDUCATION/TRAINING PROGRAM

## 2025-08-28 PROCEDURE — 6370000000 HC RX 637 (ALT 250 FOR IP): Performed by: STUDENT IN AN ORGANIZED HEALTH CARE EDUCATION/TRAINING PROGRAM

## 2025-08-28 PROCEDURE — 80048 BASIC METABOLIC PNL TOTAL CA: CPT

## 2025-08-28 PROCEDURE — 2500000003 HC RX 250 WO HCPCS: Performed by: NURSE PRACTITIONER

## 2025-08-28 PROCEDURE — 94761 N-INVAS EAR/PLS OXIMETRY MLT: CPT

## 2025-08-28 PROCEDURE — 85610 PROTHROMBIN TIME: CPT

## 2025-08-28 PROCEDURE — 2060000000 HC ICU INTERMEDIATE R&B

## 2025-08-28 PROCEDURE — 93880 EXTRACRANIAL BILAT STUDY: CPT

## 2025-08-28 PROCEDURE — 6360000002 HC RX W HCPCS

## 2025-08-28 PROCEDURE — 93306 TTE W/DOPPLER COMPLETE: CPT | Performed by: INTERNAL MEDICINE

## 2025-08-28 PROCEDURE — 93306 TTE W/DOPPLER COMPLETE: CPT

## 2025-08-28 PROCEDURE — 6370000000 HC RX 637 (ALT 250 FOR IP): Performed by: NURSE PRACTITIONER

## 2025-08-28 PROCEDURE — 93880 EXTRACRANIAL BILAT STUDY: CPT | Performed by: SURGERY

## 2025-08-28 PROCEDURE — 93356 MYOCRD STRAIN IMG SPCKL TRCK: CPT | Performed by: INTERNAL MEDICINE

## 2025-08-28 PROCEDURE — 85730 THROMBOPLASTIN TIME PARTIAL: CPT

## 2025-08-28 PROCEDURE — 86920 COMPATIBILITY TEST SPIN: CPT

## 2025-08-28 PROCEDURE — 86900 BLOOD TYPING SEROLOGIC ABO: CPT

## 2025-08-28 PROCEDURE — 99222 1ST HOSP IP/OBS MODERATE 55: CPT | Performed by: STUDENT IN AN ORGANIZED HEALTH CARE EDUCATION/TRAINING PROGRAM

## 2025-08-28 PROCEDURE — 93925 LOWER EXTREMITY STUDY: CPT | Performed by: SURGERY

## 2025-08-28 PROCEDURE — 36415 COLL VENOUS BLD VENIPUNCTURE: CPT

## 2025-08-28 PROCEDURE — 86850 RBC ANTIBODY SCREEN: CPT

## 2025-08-28 PROCEDURE — 85027 COMPLETE CBC AUTOMATED: CPT

## 2025-08-28 PROCEDURE — 6360000002 HC RX W HCPCS: Performed by: NURSE PRACTITIONER

## 2025-08-28 PROCEDURE — 04CL0ZZ EXTIRPATION OF MATTER FROM LEFT FEMORAL ARTERY, OPEN APPROACH: ICD-10-PCS | Performed by: SURGERY

## 2025-08-28 PROCEDURE — 86901 BLOOD TYPING SEROLOGIC RH(D): CPT

## 2025-08-28 PROCEDURE — 72125 CT NECK SPINE W/O DYE: CPT

## 2025-08-28 PROCEDURE — 93922 UPR/L XTREMITY ART 2 LEVELS: CPT

## 2025-08-28 PROCEDURE — 04100JK BYPASS ABDOMINAL AORTA TO BILATERAL FEMORAL ARTERIES WITH SYNTHETIC SUBSTITUTE, OPEN APPROACH: ICD-10-PCS | Performed by: SURGERY

## 2025-08-28 PROCEDURE — 99223 1ST HOSP IP/OBS HIGH 75: CPT | Performed by: NEUROLOGICAL SURGERY

## 2025-08-28 RX ORDER — SODIUM CHLORIDE 9 MG/ML
INJECTION, SOLUTION INTRAVENOUS PRN
Status: DISCONTINUED | OUTPATIENT
Start: 2025-08-28 | End: 2025-09-02 | Stop reason: HOSPADM

## 2025-08-28 RX ORDER — LIDOCAINE HYDROCHLORIDE 20 MG/ML
JELLY TOPICAL PRN
Status: DISCONTINUED | OUTPATIENT
Start: 2025-08-28 | End: 2025-09-02 | Stop reason: HOSPADM

## 2025-08-28 RX ORDER — HYDRALAZINE HYDROCHLORIDE 20 MG/ML
10 INJECTION INTRAMUSCULAR; INTRAVENOUS EVERY 4 HOURS PRN
Status: DISCONTINUED | OUTPATIENT
Start: 2025-08-28 | End: 2025-09-02 | Stop reason: HOSPADM

## 2025-08-28 RX ORDER — HEPARIN SODIUM 10000 [USP'U]/100ML
5-30 INJECTION, SOLUTION INTRAVENOUS CONTINUOUS
Status: DISCONTINUED | OUTPATIENT
Start: 2025-08-28 | End: 2025-08-29

## 2025-08-28 RX ORDER — HYDROCORTISONE 25 MG/G
CREAM TOPICAL 2 TIMES DAILY
Status: DISCONTINUED | OUTPATIENT
Start: 2025-08-28 | End: 2025-09-02 | Stop reason: HOSPADM

## 2025-08-28 RX ORDER — LABETALOL HYDROCHLORIDE 5 MG/ML
10 INJECTION, SOLUTION INTRAVENOUS EVERY 4 HOURS PRN
Status: DISCONTINUED | OUTPATIENT
Start: 2025-08-28 | End: 2025-09-02 | Stop reason: HOSPADM

## 2025-08-28 RX ORDER — ASPIRIN 81 MG/1
81 TABLET ORAL DAILY
Status: DISCONTINUED | OUTPATIENT
Start: 2025-08-28 | End: 2025-09-02 | Stop reason: HOSPADM

## 2025-08-28 RX ADMIN — LIDOCAINE HYDROCHLORIDE: 20 JELLY TOPICAL at 16:45

## 2025-08-28 RX ADMIN — ASPIRIN 81 MG: 81 TABLET, COATED ORAL at 07:49

## 2025-08-28 RX ADMIN — CARVEDILOL 12.5 MG: 12.5 TABLET, FILM COATED ORAL at 01:50

## 2025-08-28 RX ADMIN — CARVEDILOL 12.5 MG: 12.5 TABLET, FILM COATED ORAL at 19:46

## 2025-08-28 RX ADMIN — HYDRALAZINE HYDROCHLORIDE 10 MG: 20 INJECTION INTRAMUSCULAR; INTRAVENOUS at 04:15

## 2025-08-28 RX ADMIN — CARVEDILOL 12.5 MG: 12.5 TABLET, FILM COATED ORAL at 07:48

## 2025-08-28 RX ADMIN — EZETIMIBE 10 MG: 10 TABLET ORAL at 07:48

## 2025-08-28 RX ADMIN — SODIUM CHLORIDE, PRESERVATIVE FREE 10 ML: 5 INJECTION INTRAVENOUS at 01:51

## 2025-08-28 RX ADMIN — HEPARIN SODIUM AND DEXTROSE 15 UNITS/KG/HR: 10000; 5 INJECTION INTRAVENOUS at 12:13

## 2025-08-28 RX ADMIN — HYDRALAZINE HYDROCHLORIDE 10 MG: 20 INJECTION INTRAMUSCULAR; INTRAVENOUS at 12:11

## 2025-08-28 RX ADMIN — SODIUM CHLORIDE, PRESERVATIVE FREE 10 ML: 5 INJECTION INTRAVENOUS at 07:49

## 2025-08-28 RX ADMIN — ATORVASTATIN CALCIUM 80 MG: 80 TABLET, FILM COATED ORAL at 07:48

## 2025-08-28 RX ADMIN — LISINOPRIL 40 MG: 20 TABLET ORAL at 07:48

## 2025-08-28 RX ADMIN — HEPARIN SODIUM AND DEXTROSE 15 UNITS/KG/HR: 10000; 5 INJECTION INTRAVENOUS at 19:59

## 2025-08-28 ASSESSMENT — PAIN - FUNCTIONAL ASSESSMENT
PAIN_FUNCTIONAL_ASSESSMENT: 0-10
PAIN_FUNCTIONAL_ASSESSMENT: 0-10

## 2025-08-28 ASSESSMENT — PAIN SCALES - GENERAL
PAINLEVEL_OUTOF10: 9
PAINLEVEL_OUTOF10: 0
PAINLEVEL_OUTOF10: 0
PAINLEVEL_OUTOF10: 8
PAINLEVEL_OUTOF10: 0
PAINLEVEL_OUTOF10: 4

## 2025-08-28 ASSESSMENT — PAIN DESCRIPTION - DESCRIPTORS: DESCRIPTORS: BURNING;DISCOMFORT

## 2025-08-28 ASSESSMENT — PAIN DESCRIPTION - LOCATION
LOCATION: RECTUM
LOCATION: RECTUM

## 2025-08-29 ENCOUNTER — ANESTHESIA (OUTPATIENT)
Dept: OPERATING ROOM | Age: 65
End: 2025-08-29
Payer: COMMERCIAL

## 2025-08-29 ENCOUNTER — ANESTHESIA EVENT (OUTPATIENT)
Dept: OPERATING ROOM | Age: 65
End: 2025-08-29
Payer: COMMERCIAL

## 2025-08-29 LAB
ANION GAP SERPL CALCULATED.3IONS-SCNC: 11 MMOL/L (ref 9–16)
ANION GAP SERPL CALCULATED.3IONS-SCNC: 16 MMOL/L (ref 9–16)
ARTERIAL PATENCY WRIST A: ABNORMAL
BASOPHILS # BLD: 0.06 K/UL (ref 0–0.2)
BASOPHILS NFR BLD: 1 % (ref 0–2)
BODY TEMPERATURE: 36
BUN SERPL-MCNC: 19 MG/DL (ref 8–23)
BUN SERPL-MCNC: 20 MG/DL (ref 8–23)
CA-I BLD-SCNC: 1.1 MMOL/L (ref 1.13–1.33)
CA-I BLD-SCNC: 1.22 MMOL/L (ref 1.13–1.33)
CALCIUM SERPL-MCNC: 10.1 MG/DL (ref 8.6–10.4)
CALCIUM SERPL-MCNC: 9.7 MG/DL (ref 8.6–10.4)
CHLORIDE SERPL-SCNC: 102 MMOL/L (ref 98–107)
CHLORIDE SERPL-SCNC: 109 MMOL/L (ref 98–107)
CHLORIDE, WHOLE BLOOD: 110 MMOL/L (ref 98–110)
CO2 SERPL-SCNC: 22 MMOL/L (ref 20–31)
CO2 SERPL-SCNC: 22 MMOL/L (ref 20–31)
COHGB MFR BLD: 0 % (ref 0–5)
CREAT SERPL-MCNC: 0.8 MG/DL (ref 0.6–0.9)
CREAT SERPL-MCNC: 0.9 MG/DL (ref 0.6–0.9)
EOSINOPHIL # BLD: 0.05 K/UL (ref 0–0.44)
EOSINOPHILS RELATIVE PERCENT: 0 % (ref 1–4)
ERYTHROCYTE [DISTWIDTH] IN BLOOD BY AUTOMATED COUNT: 13.5 % (ref 11.8–14.4)
ERYTHROCYTE [DISTWIDTH] IN BLOOD BY AUTOMATED COUNT: 13.6 % (ref 11.8–14.4)
FIO2 ON VENT: 60 %
GFR, ESTIMATED: 71 ML/MIN/1.73M2
GFR, ESTIMATED: 82 ML/MIN/1.73M2
GLUCOSE BLD-MCNC: 155 MG/DL (ref 65–105)
GLUCOSE SERPL-MCNC: 124 MG/DL (ref 74–99)
GLUCOSE SERPL-MCNC: 162 MG/DL (ref 74–99)
HCO3 ARTERIAL: 21 MMOL/L (ref 22–27)
HCT VFR BLD AUTO: 34.7 % (ref 36.3–47.1)
HCT VFR BLD AUTO: 41.7 % (ref 36.3–47.1)
HCT VFR BLD CALC: 33 % (ref 36.3–47.1)
HEMOGLOBIN: 11.3 GM/DL (ref 11.9–15.1)
HGB BLD-MCNC: 11.6 G/DL (ref 11.9–15.1)
HGB BLD-MCNC: 13.5 G/DL (ref 11.9–15.1)
IMM GRANULOCYTES # BLD AUTO: 0.05 K/UL (ref 0–0.3)
IMM GRANULOCYTES NFR BLD: 0 %
LACTIC ACID, WHOLE BLOOD: 1.1 MMOL/L (ref 0.7–2.1)
LYMPHOCYTES NFR BLD: 3.37 K/UL (ref 1.1–3.7)
LYMPHOCYTES RELATIVE PERCENT: 26 % (ref 24–43)
MAGNESIUM SERPL-MCNC: 1.4 MG/DL (ref 1.6–2.4)
MCH RBC QN AUTO: 29.3 PG (ref 25.2–33.5)
MCH RBC QN AUTO: 30 PG (ref 25.2–33.5)
MCHC RBC AUTO-ENTMCNC: 32.4 G/DL (ref 28.4–34.8)
MCHC RBC AUTO-ENTMCNC: 33.4 G/DL (ref 28.4–34.8)
MCV RBC AUTO: 89.7 FL (ref 82.6–102.9)
MCV RBC AUTO: 90.5 FL (ref 82.6–102.9)
MONOCYTES NFR BLD: 0.86 K/UL (ref 0.1–1.2)
MONOCYTES NFR BLD: 7 % (ref 3–12)
NEGATIVE BASE EXCESS, ART: 3.7 MMOL/L (ref 0–2)
NEUTROPHILS NFR BLD: 66 % (ref 36–65)
NEUTS SEG NFR BLD: 8.83 K/UL (ref 1.5–8.1)
NRBC BLD-RTO: 0 PER 100 WBC
NRBC BLD-RTO: 0 PER 100 WBC
O2 SAT, ARTERIAL: 99.5 % (ref 94–100)
PCO2 ARTERIAL: 36.8 MMHG (ref 32–45)
PH ARTERIAL: 7.38 (ref 7.35–7.45)
PHOSPHATE SERPL-MCNC: 3.6 MG/DL (ref 2.5–4.5)
PLATELET # BLD AUTO: 204 K/UL (ref 138–453)
PLATELET # BLD AUTO: 279 K/UL (ref 138–453)
PMV BLD AUTO: 10.9 FL (ref 8.1–13.5)
PMV BLD AUTO: 11 FL (ref 8.1–13.5)
PO2 ARTERIAL: 234.5 MMHG (ref 75–95)
POTASSIUM SERPL-SCNC: 3.9 MMOL/L (ref 3.7–5.3)
POTASSIUM SERPL-SCNC: 3.9 MMOL/L (ref 3.7–5.3)
POTASSIUM, WHOLE BLOOD: 3.6 MMOL/L (ref 3.6–5)
RBC # BLD AUTO: 3.87 M/UL (ref 3.95–5.11)
RBC # BLD AUTO: 4.61 M/UL (ref 3.95–5.11)
SODIUM SERPL-SCNC: 140 MMOL/L (ref 136–145)
SODIUM SERPL-SCNC: 142 MMOL/L (ref 136–145)
SODIUM, WHOLE BLOOD: 140 MMOL/L (ref 136–145)
WBC OTHER # BLD: 13.2 K/UL (ref 3.5–11.3)
WBC OTHER # BLD: 20.7 K/UL (ref 3.5–11.3)

## 2025-08-29 PROCEDURE — 85025 COMPLETE CBC W/AUTO DIFF WBC: CPT

## 2025-08-29 PROCEDURE — 82805 BLOOD GASES W/O2 SATURATION: CPT

## 2025-08-29 PROCEDURE — 6360000002 HC RX W HCPCS: Performed by: ANESTHESIOLOGY

## 2025-08-29 PROCEDURE — 83605 ASSAY OF LACTIC ACID: CPT

## 2025-08-29 PROCEDURE — 2720000010 HC SURG SUPPLY STERILE: Performed by: SURGERY

## 2025-08-29 PROCEDURE — 2580000003 HC RX 258

## 2025-08-29 PROCEDURE — 88304 TISSUE EXAM BY PATHOLOGIST: CPT

## 2025-08-29 PROCEDURE — 6360000002 HC RX W HCPCS

## 2025-08-29 PROCEDURE — 6370000000 HC RX 637 (ALT 250 FOR IP)

## 2025-08-29 PROCEDURE — 6360000002 HC RX W HCPCS: Performed by: STUDENT IN AN ORGANIZED HEALTH CARE EDUCATION/TRAINING PROGRAM

## 2025-08-29 PROCEDURE — 2500000003 HC RX 250 WO HCPCS: Performed by: NURSE PRACTITIONER

## 2025-08-29 PROCEDURE — 80048 BASIC METABOLIC PNL TOTAL CA: CPT

## 2025-08-29 PROCEDURE — 64488 TAP BLOCK BI INJECTION: CPT | Performed by: ANESTHESIOLOGY

## 2025-08-29 PROCEDURE — 84132 ASSAY OF SERUM POTASSIUM: CPT

## 2025-08-29 PROCEDURE — 6360000002 HC RX W HCPCS: Performed by: SURGERY

## 2025-08-29 PROCEDURE — 82330 ASSAY OF CALCIUM: CPT

## 2025-08-29 PROCEDURE — 2500000003 HC RX 250 WO HCPCS

## 2025-08-29 PROCEDURE — 83735 ASSAY OF MAGNESIUM: CPT

## 2025-08-29 PROCEDURE — 3700000001 HC ADD 15 MINUTES (ANESTHESIA): Performed by: SURGERY

## 2025-08-29 PROCEDURE — 6370000000 HC RX 637 (ALT 250 FOR IP): Performed by: STUDENT IN AN ORGANIZED HEALTH CARE EDUCATION/TRAINING PROGRAM

## 2025-08-29 PROCEDURE — 36620 INSERTION CATHETER ARTERY: CPT

## 2025-08-29 PROCEDURE — 3600000004 HC SURGERY LEVEL 4 BASE: Performed by: SURGERY

## 2025-08-29 PROCEDURE — 3600000014 HC SURGERY LEVEL 4 ADDTL 15MIN: Performed by: SURGERY

## 2025-08-29 PROCEDURE — 84100 ASSAY OF PHOSPHORUS: CPT

## 2025-08-29 PROCEDURE — 6370000000 HC RX 637 (ALT 250 FOR IP): Performed by: SURGERY

## 2025-08-29 PROCEDURE — 3700000000 HC ANESTHESIA ATTENDED CARE: Performed by: SURGERY

## 2025-08-29 PROCEDURE — 85014 HEMATOCRIT: CPT

## 2025-08-29 PROCEDURE — C1768 GRAFT, VASCULAR: HCPCS | Performed by: SURGERY

## 2025-08-29 PROCEDURE — 99233 SBSQ HOSP IP/OBS HIGH 50: CPT | Performed by: STUDENT IN AN ORGANIZED HEALTH CARE EDUCATION/TRAINING PROGRAM

## 2025-08-29 PROCEDURE — 2709999900 HC NON-CHARGEABLE SUPPLY: Performed by: SURGERY

## 2025-08-29 PROCEDURE — P9045 ALBUMIN (HUMAN), 5%, 250 ML: HCPCS

## 2025-08-29 PROCEDURE — 85018 HEMOGLOBIN: CPT

## 2025-08-29 PROCEDURE — 36415 COLL VENOUS BLD VENIPUNCTURE: CPT

## 2025-08-29 PROCEDURE — 2500000003 HC RX 250 WO HCPCS: Performed by: SURGERY

## 2025-08-29 PROCEDURE — 85027 COMPLETE CBC AUTOMATED: CPT

## 2025-08-29 PROCEDURE — 2000000000 HC ICU R&B

## 2025-08-29 PROCEDURE — 6370000000 HC RX 637 (ALT 250 FOR IP): Performed by: NURSE PRACTITIONER

## 2025-08-29 DEVICE — PLEDGET SURG W3.5XL7MM THK1.5MM WHT PTFE RECT FIRM TFE: Type: IMPLANTABLE DEVICE | Status: FUNCTIONAL

## 2025-08-29 DEVICE — IMPLANTABLE DEVICE: Type: IMPLANTABLE DEVICE | Site: AORTA | Status: FUNCTIONAL

## 2025-08-29 DEVICE — CLIP INT L ORNG TI TRNSVRS GRV CHEVRON SHP W/ PRECIS TIP TO: Type: IMPLANTABLE DEVICE | Status: FUNCTIONAL

## 2025-08-29 DEVICE — PLEDGET SURG W3.5XL7MM THK1.5MM WHT PTFE RECT SFT TFE: Type: IMPLANTABLE DEVICE | Status: FUNCTIONAL

## 2025-08-29 DEVICE — CLIP INT M L GRN TI TRNSVRS GRV CHEVRON SHP W/ PRECIS TIP: Type: IMPLANTABLE DEVICE | Status: FUNCTIONAL

## 2025-08-29 RX ORDER — FENTANYL CITRATE 50 UG/ML
INJECTION, SOLUTION INTRAMUSCULAR; INTRAVENOUS
Status: DISCONTINUED | OUTPATIENT
Start: 2025-08-29 | End: 2025-08-29 | Stop reason: SDUPTHER

## 2025-08-29 RX ORDER — FENTANYL CITRATE 50 UG/ML
50 INJECTION, SOLUTION INTRAMUSCULAR; INTRAVENOUS
Status: DISCONTINUED | OUTPATIENT
Start: 2025-08-29 | End: 2025-09-02

## 2025-08-29 RX ORDER — SODIUM CHLORIDE, SODIUM LACTATE, POTASSIUM CHLORIDE, CALCIUM CHLORIDE 600; 310; 30; 20 MG/100ML; MG/100ML; MG/100ML; MG/100ML
INJECTION, SOLUTION INTRAVENOUS CONTINUOUS
Status: DISCONTINUED | OUTPATIENT
Start: 2025-08-29 | End: 2025-09-01

## 2025-08-29 RX ORDER — MAGNESIUM SULFATE HEPTAHYDRATE 40 MG/ML
4000 INJECTION, SOLUTION INTRAVENOUS ONCE
Status: COMPLETED | OUTPATIENT
Start: 2025-08-29 | End: 2025-08-30

## 2025-08-29 RX ORDER — ALBUMIN HUMAN 50 G/1000ML
SOLUTION INTRAVENOUS
Status: DISCONTINUED | OUTPATIENT
Start: 2025-08-29 | End: 2025-08-29 | Stop reason: SDUPTHER

## 2025-08-29 RX ORDER — MIDAZOLAM HYDROCHLORIDE 2 MG/2ML
2 INJECTION, SOLUTION INTRAMUSCULAR; INTRAVENOUS ONCE
Status: COMPLETED | OUTPATIENT
Start: 2025-08-29 | End: 2025-08-29

## 2025-08-29 RX ORDER — SODIUM CHLORIDE 9 MG/ML
INJECTION, SOLUTION INTRAVENOUS
Status: DISCONTINUED | OUTPATIENT
Start: 2025-08-29 | End: 2025-08-29 | Stop reason: SDUPTHER

## 2025-08-29 RX ORDER — BUPIVACAINE HYDROCHLORIDE 5 MG/ML
INJECTION, SOLUTION EPIDURAL; INTRACAUDAL; PERINEURAL
Status: COMPLETED
Start: 2025-08-29 | End: 2025-08-29

## 2025-08-29 RX ORDER — PROPOFOL 10 MG/ML
INJECTION, EMULSION INTRAVENOUS
Status: DISCONTINUED | OUTPATIENT
Start: 2025-08-29 | End: 2025-08-29 | Stop reason: SDUPTHER

## 2025-08-29 RX ORDER — CEFAZOLIN SODIUM 1 G/3ML
INJECTION, POWDER, FOR SOLUTION INTRAMUSCULAR; INTRAVENOUS
Status: DISCONTINUED | OUTPATIENT
Start: 2025-08-29 | End: 2025-08-29 | Stop reason: SDUPTHER

## 2025-08-29 RX ORDER — HYDRALAZINE HYDROCHLORIDE 20 MG/ML
INJECTION INTRAMUSCULAR; INTRAVENOUS
Status: DISCONTINUED | OUTPATIENT
Start: 2025-08-29 | End: 2025-08-29 | Stop reason: SDUPTHER

## 2025-08-29 RX ORDER — LIDOCAINE HYDROCHLORIDE 10 MG/ML
INJECTION, SOLUTION EPIDURAL; INFILTRATION; INTRACAUDAL; PERINEURAL
Status: DISCONTINUED | OUTPATIENT
Start: 2025-08-29 | End: 2025-08-29 | Stop reason: SDUPTHER

## 2025-08-29 RX ORDER — SCOPOLAMINE 1 MG/3D
1 PATCH, EXTENDED RELEASE TRANSDERMAL
Status: DISCONTINUED | OUTPATIENT
Start: 2025-08-29 | End: 2025-08-29

## 2025-08-29 RX ORDER — PHENYLEPHRINE HCL IN 0.9% NACL 1 MG/10 ML
SYRINGE (ML) INTRAVENOUS
Status: DISCONTINUED | OUTPATIENT
Start: 2025-08-29 | End: 2025-08-29 | Stop reason: SDUPTHER

## 2025-08-29 RX ORDER — ONDANSETRON 2 MG/ML
4 INJECTION INTRAMUSCULAR; INTRAVENOUS ONCE
Status: COMPLETED | OUTPATIENT
Start: 2025-08-29 | End: 2025-08-29

## 2025-08-29 RX ORDER — METOCLOPRAMIDE HYDROCHLORIDE 5 MG/ML
10 INJECTION INTRAMUSCULAR; INTRAVENOUS
Status: DISCONTINUED | OUTPATIENT
Start: 2025-08-29 | End: 2025-08-29

## 2025-08-29 RX ORDER — MIDAZOLAM HYDROCHLORIDE 1 MG/ML
INJECTION, SOLUTION INTRAMUSCULAR; INTRAVENOUS
Status: DISCONTINUED | OUTPATIENT
Start: 2025-08-29 | End: 2025-08-29 | Stop reason: SDUPTHER

## 2025-08-29 RX ORDER — SODIUM CHLORIDE 0.9 % (FLUSH) 0.9 %
5-40 SYRINGE (ML) INJECTION EVERY 12 HOURS SCHEDULED
Status: DISCONTINUED | OUTPATIENT
Start: 2025-08-29 | End: 2025-08-29

## 2025-08-29 RX ORDER — PROCHLORPERAZINE EDISYLATE 5 MG/ML
5 INJECTION INTRAMUSCULAR; INTRAVENOUS
Status: DISCONTINUED | OUTPATIENT
Start: 2025-08-29 | End: 2025-08-29

## 2025-08-29 RX ORDER — EPHEDRINE SULFATE/0.9% NACL/PF 25 MG/5 ML
SYRINGE (ML) INTRAVENOUS
Status: DISCONTINUED | OUTPATIENT
Start: 2025-08-29 | End: 2025-08-29 | Stop reason: SDUPTHER

## 2025-08-29 RX ORDER — HYDRALAZINE HYDROCHLORIDE 20 MG/ML
INJECTION INTRAMUSCULAR; INTRAVENOUS
Status: COMPLETED
Start: 2025-08-29 | End: 2025-08-29

## 2025-08-29 RX ORDER — HYDRALAZINE HYDROCHLORIDE 20 MG/ML
INJECTION INTRAMUSCULAR; INTRAVENOUS
Status: DISCONTINUED | OUTPATIENT
Start: 2025-08-29 | End: 2025-08-29

## 2025-08-29 RX ORDER — CALCIUM CHLORIDE 100 MG/ML
INJECTION INTRAVENOUS; INTRAVENTRICULAR
Status: DISCONTINUED | OUTPATIENT
Start: 2025-08-29 | End: 2025-08-29 | Stop reason: SDUPTHER

## 2025-08-29 RX ORDER — LABETALOL HYDROCHLORIDE 5 MG/ML
10 INJECTION, SOLUTION INTRAVENOUS
Status: DISCONTINUED | OUTPATIENT
Start: 2025-08-29 | End: 2025-08-29

## 2025-08-29 RX ORDER — FENTANYL CITRATE 50 UG/ML
75 INJECTION, SOLUTION INTRAMUSCULAR; INTRAVENOUS
Status: DISCONTINUED | OUTPATIENT
Start: 2025-08-29 | End: 2025-09-02

## 2025-08-29 RX ORDER — HEPARIN SODIUM 1000 [USP'U]/ML
INJECTION, SOLUTION INTRAVENOUS; SUBCUTANEOUS
Status: DISCONTINUED
Start: 2025-08-29 | End: 2025-08-30

## 2025-08-29 RX ORDER — LABETALOL HYDROCHLORIDE 5 MG/ML
INJECTION, SOLUTION INTRAVENOUS
Status: DISCONTINUED | OUTPATIENT
Start: 2025-08-29 | End: 2025-08-29 | Stop reason: SDUPTHER

## 2025-08-29 RX ORDER — SODIUM CHLORIDE 0.9 % (FLUSH) 0.9 %
5-40 SYRINGE (ML) INJECTION PRN
Status: DISCONTINUED | OUTPATIENT
Start: 2025-08-29 | End: 2025-08-29

## 2025-08-29 RX ORDER — ROCURONIUM BROMIDE 10 MG/ML
INJECTION, SOLUTION INTRAVENOUS
Status: DISCONTINUED | OUTPATIENT
Start: 2025-08-29 | End: 2025-08-29 | Stop reason: SDUPTHER

## 2025-08-29 RX ORDER — SODIUM CHLORIDE 9 MG/ML
INJECTION, SOLUTION INTRAVENOUS PRN
Status: DISCONTINUED | OUTPATIENT
Start: 2025-08-29 | End: 2025-08-29

## 2025-08-29 RX ORDER — DEXAMETHASONE SODIUM PHOSPHATE 10 MG/ML
INJECTION, SOLUTION INTRA-ARTICULAR; INTRALESIONAL; INTRAMUSCULAR; INTRAVENOUS; SOFT TISSUE
Status: DISCONTINUED | OUTPATIENT
Start: 2025-08-29 | End: 2025-08-29 | Stop reason: SDUPTHER

## 2025-08-29 RX ORDER — SODIUM CHLORIDE 9 MG/ML
INJECTION, SOLUTION INTRAVENOUS PRN
Status: DISCONTINUED | OUTPATIENT
Start: 2025-08-29 | End: 2025-09-02 | Stop reason: HOSPADM

## 2025-08-29 RX ORDER — GLYCOPYRROLATE 0.2 MG/ML
INJECTION INTRAMUSCULAR; INTRAVENOUS
Status: DISCONTINUED | OUTPATIENT
Start: 2025-08-29 | End: 2025-08-29 | Stop reason: SDUPTHER

## 2025-08-29 RX ORDER — PROTAMINE SULFATE 10 MG/ML
INJECTION, SOLUTION INTRAVENOUS
Status: DISCONTINUED | OUTPATIENT
Start: 2025-08-29 | End: 2025-08-29 | Stop reason: SDUPTHER

## 2025-08-29 RX ORDER — MAGNESIUM HYDROXIDE 1200 MG/15ML
LIQUID ORAL CONTINUOUS PRN
Status: COMPLETED | OUTPATIENT
Start: 2025-08-29 | End: 2025-08-29

## 2025-08-29 RX ORDER — BUPIVACAINE HYDROCHLORIDE 5 MG/ML
INJECTION, SOLUTION EPIDURAL; INTRACAUDAL; PERINEURAL
Status: COMPLETED | OUTPATIENT
Start: 2025-08-29 | End: 2025-08-29

## 2025-08-29 RX ORDER — BUPIVACAINE HYDROCHLORIDE 5 MG/ML
INJECTION, SOLUTION EPIDURAL; INTRACAUDAL; PERINEURAL
Status: DISCONTINUED | OUTPATIENT
Start: 2025-08-29 | End: 2025-08-29

## 2025-08-29 RX ORDER — IPRATROPIUM BROMIDE AND ALBUTEROL SULFATE 2.5; .5 MG/3ML; MG/3ML
1 SOLUTION RESPIRATORY (INHALATION)
Status: DISCONTINUED | OUTPATIENT
Start: 2025-08-29 | End: 2025-08-29

## 2025-08-29 RX ORDER — SODIUM CHLORIDE, SODIUM LACTATE, POTASSIUM CHLORIDE, CALCIUM CHLORIDE 600; 310; 30; 20 MG/100ML; MG/100ML; MG/100ML; MG/100ML
INJECTION, SOLUTION INTRAVENOUS
Status: DISCONTINUED | OUTPATIENT
Start: 2025-08-29 | End: 2025-08-29 | Stop reason: SDUPTHER

## 2025-08-29 RX ORDER — PROTAMINE SULFATE 10 MG/ML
INJECTION, SOLUTION INTRAVENOUS
Status: COMPLETED
Start: 2025-08-29 | End: 2025-08-29

## 2025-08-29 RX ORDER — ONDANSETRON 2 MG/ML
INJECTION INTRAMUSCULAR; INTRAVENOUS
Status: DISCONTINUED | OUTPATIENT
Start: 2025-08-29 | End: 2025-08-29 | Stop reason: SDUPTHER

## 2025-08-29 RX ORDER — HEPARIN SODIUM 1000 [USP'U]/ML
INJECTION, SOLUTION INTRAVENOUS; SUBCUTANEOUS
Status: DISCONTINUED | OUTPATIENT
Start: 2025-08-29 | End: 2025-08-29 | Stop reason: SDUPTHER

## 2025-08-29 RX ORDER — HYDRALAZINE HYDROCHLORIDE 20 MG/ML
10 INJECTION INTRAMUSCULAR; INTRAVENOUS
Status: DISCONTINUED | OUTPATIENT
Start: 2025-08-29 | End: 2025-08-29

## 2025-08-29 RX ADMIN — Medication 10 MG: at 15:32

## 2025-08-29 RX ADMIN — PROTAMINE SULFATE 10 MG: 10 INJECTION, SOLUTION INTRAVENOUS at 17:12

## 2025-08-29 RX ADMIN — CARVEDILOL 12.5 MG: 12.5 TABLET, FILM COATED ORAL at 21:04

## 2025-08-29 RX ADMIN — FENTANYL CITRATE 100 MCG: 50 INJECTION, SOLUTION INTRAMUSCULAR; INTRAVENOUS at 15:51

## 2025-08-29 RX ADMIN — ONDANSETRON 4 MG: 2 INJECTION, SOLUTION INTRAMUSCULAR; INTRAVENOUS at 20:15

## 2025-08-29 RX ADMIN — SODIUM CHLORIDE, POTASSIUM CHLORIDE, SODIUM LACTATE AND CALCIUM CHLORIDE: 600; 310; 30; 20 INJECTION, SOLUTION INTRAVENOUS at 16:28

## 2025-08-29 RX ADMIN — FENTANYL CITRATE 100 MCG: 50 INJECTION, SOLUTION INTRAMUSCULAR; INTRAVENOUS at 14:10

## 2025-08-29 RX ADMIN — PROPOFOL 150 MG: 10 INJECTION, EMULSION INTRAVENOUS at 14:10

## 2025-08-29 RX ADMIN — HYDRALAZINE HYDROCHLORIDE 5 MG: 20 INJECTION INTRAMUSCULAR; INTRAVENOUS at 18:21

## 2025-08-29 RX ADMIN — BUPIVACAINE 20 ML: 13.3 INJECTION, SUSPENSION, LIPOSOMAL INFILTRATION at 18:05

## 2025-08-29 RX ADMIN — HYDROCORTISONE 2.5%: 25 CREAM TOPICAL at 07:46

## 2025-08-29 RX ADMIN — LIDOCAINE HYDROCHLORIDE 50 MG: 10 INJECTION, SOLUTION EPIDURAL; INFILTRATION; INTRACAUDAL; PERINEURAL at 14:10

## 2025-08-29 RX ADMIN — DEXAMETHASONE SODIUM PHOSPHATE 5 MG: 10 INJECTION INTRAMUSCULAR; INTRAVENOUS at 14:57

## 2025-08-29 RX ADMIN — ROCURONIUM BROMIDE 30 MG: 10 INJECTION, SOLUTION INTRAVENOUS at 15:13

## 2025-08-29 RX ADMIN — Medication 30 MG: at 14:35

## 2025-08-29 RX ADMIN — Medication 100 MCG: at 16:28

## 2025-08-29 RX ADMIN — HEPARIN SODIUM 3000 UNITS: 1000 INJECTION INTRAVENOUS; SUBCUTANEOUS at 16:45

## 2025-08-29 RX ADMIN — FENTANYL CITRATE 50 MCG: 50 INJECTION, SOLUTION INTRAMUSCULAR; INTRAVENOUS at 18:26

## 2025-08-29 RX ADMIN — Medication 100 MCG: at 15:23

## 2025-08-29 RX ADMIN — SODIUM CHLORIDE: 9 INJECTION, SOLUTION INTRAVENOUS at 14:04

## 2025-08-29 RX ADMIN — ROCURONIUM BROMIDE 50 MG: 10 INJECTION, SOLUTION INTRAVENOUS at 14:10

## 2025-08-29 RX ADMIN — MIDAZOLAM HYDROCHLORIDE 2 MG: 1 INJECTION, SOLUTION INTRAMUSCULAR; INTRAVENOUS at 13:07

## 2025-08-29 RX ADMIN — ROCURONIUM BROMIDE 20 MG: 10 INJECTION, SOLUTION INTRAVENOUS at 15:52

## 2025-08-29 RX ADMIN — EPHEDRINE SULFATE 5 MG: 5 INJECTION INTRAVENOUS at 14:42

## 2025-08-29 RX ADMIN — GLYCOPYRROLATE 0.2 MG: 0.2 INJECTION INTRAMUSCULAR; INTRAVENOUS at 14:40

## 2025-08-29 RX ADMIN — PROTAMINE SULFATE 10 MG: 10 INJECTION, SOLUTION INTRAVENOUS at 17:20

## 2025-08-29 RX ADMIN — Medication 100 MCG: at 16:56

## 2025-08-29 RX ADMIN — CALCIUM CHLORIDE 1 G: 100 INJECTION INTRAVENOUS; INTRAVENTRICULAR at 16:09

## 2025-08-29 RX ADMIN — FENTANYL CITRATE 50 MCG: 50 INJECTION, SOLUTION INTRAMUSCULAR; INTRAVENOUS at 14:35

## 2025-08-29 RX ADMIN — Medication 10 MG: at 15:15

## 2025-08-29 RX ADMIN — EPHEDRINE SULFATE 5 MG: 5 INJECTION INTRAVENOUS at 14:48

## 2025-08-29 RX ADMIN — BUPIVACAINE HYDROCHLORIDE 20 ML: 5 INJECTION, SOLUTION EPIDURAL; INTRACAUDAL; PERINEURAL at 18:05

## 2025-08-29 RX ADMIN — FENTANYL CITRATE 75 MCG: 50 INJECTION INTRAMUSCULAR; INTRAVENOUS at 22:31

## 2025-08-29 RX ADMIN — ALBUMIN (HUMAN) 12.5 G: 12.5 INJECTION, SOLUTION INTRAVENOUS at 15:27

## 2025-08-29 RX ADMIN — SODIUM CHLORIDE, SODIUM LACTATE, POTASSIUM CHLORIDE, AND CALCIUM CHLORIDE: .6; .31; .03; .02 INJECTION, SOLUTION INTRAVENOUS at 20:19

## 2025-08-29 RX ADMIN — ROCURONIUM BROMIDE 20 MG: 10 INJECTION, SOLUTION INTRAVENOUS at 16:38

## 2025-08-29 RX ADMIN — Medication 5 MG: at 18:06

## 2025-08-29 RX ADMIN — MIDAZOLAM 2 MG: 1 INJECTION INTRAMUSCULAR; INTRAVENOUS at 14:04

## 2025-08-29 RX ADMIN — PROTAMINE SULFATE 10 MG: 10 INJECTION, SOLUTION INTRAVENOUS at 17:15

## 2025-08-29 RX ADMIN — HEPARIN SODIUM 8000 UNITS: 1000 INJECTION INTRAVENOUS; SUBCUTANEOUS at 15:44

## 2025-08-29 RX ADMIN — SODIUM CHLORIDE, SODIUM LACTATE, POTASSIUM CHLORIDE, CALCIUM CHLORIDE: 600; 310; 30; 20 INJECTION, SOLUTION INTRAVENOUS at 15:30

## 2025-08-29 RX ADMIN — HYDROCORTISONE 2.5%: 25 CREAM TOPICAL at 21:00

## 2025-08-29 RX ADMIN — SODIUM CHLORIDE, PRESERVATIVE FREE 10 ML: 5 INJECTION INTRAVENOUS at 07:50

## 2025-08-29 RX ADMIN — SODIUM CHLORIDE, POTASSIUM CHLORIDE, SODIUM LACTATE AND CALCIUM CHLORIDE: 600; 310; 30; 20 INJECTION, SOLUTION INTRAVENOUS at 15:30

## 2025-08-29 RX ADMIN — LIDOCAINE HYDROCHLORIDE: 20 JELLY TOPICAL at 03:59

## 2025-08-29 RX ADMIN — Medication 100 MCG: at 16:36

## 2025-08-29 RX ADMIN — MAGNESIUM SULFATE HEPTAHYDRATE 4000 MG: 40 INJECTION, SOLUTION INTRAVENOUS at 20:25

## 2025-08-29 RX ADMIN — FENTANYL CITRATE 75 MCG: 50 INJECTION INTRAMUSCULAR; INTRAVENOUS at 20:00

## 2025-08-29 RX ADMIN — Medication 5 MG: at 18:10

## 2025-08-29 RX ADMIN — CEFAZOLIN 2 G: 1 INJECTION, POWDER, FOR SOLUTION INTRAMUSCULAR; INTRAVENOUS at 14:20

## 2025-08-29 RX ADMIN — ATORVASTATIN CALCIUM 80 MG: 80 TABLET, FILM COATED ORAL at 07:45

## 2025-08-29 RX ADMIN — Medication 5 MG: at 18:02

## 2025-08-29 RX ADMIN — EZETIMIBE 10 MG: 10 TABLET ORAL at 07:45

## 2025-08-29 RX ADMIN — FENTANYL CITRATE 25 MCG: 50 INJECTION, SOLUTION INTRAMUSCULAR; INTRAVENOUS at 14:59

## 2025-08-29 RX ADMIN — PROTAMINE SULFATE 10 MG: 10 INJECTION, SOLUTION INTRAVENOUS at 17:09

## 2025-08-29 RX ADMIN — Medication 5 MG: at 18:14

## 2025-08-29 RX ADMIN — SUGAMMADEX 200 MG: 100 INJECTION, SOLUTION INTRAVENOUS at 18:14

## 2025-08-29 RX ADMIN — ALBUMIN (HUMAN) 12.5 G: 12.5 INJECTION, SOLUTION INTRAVENOUS at 15:33

## 2025-08-29 RX ADMIN — FENTANYL CITRATE 25 MCG: 50 INJECTION, SOLUTION INTRAMUSCULAR; INTRAVENOUS at 15:15

## 2025-08-29 RX ADMIN — SODIUM CHLORIDE, SODIUM LACTATE, POTASSIUM CHLORIDE, CALCIUM CHLORIDE: 600; 310; 30; 20 INJECTION, SOLUTION INTRAVENOUS at 17:15

## 2025-08-29 RX ADMIN — LISINOPRIL 40 MG: 20 TABLET ORAL at 07:45

## 2025-08-29 RX ADMIN — FENTANYL CITRATE 50 MCG: 50 INJECTION, SOLUTION INTRAMUSCULAR; INTRAVENOUS at 18:19

## 2025-08-29 RX ADMIN — ONDANSETRON 4 MG: 2 INJECTION, SOLUTION INTRAMUSCULAR; INTRAVENOUS at 17:22

## 2025-08-29 ASSESSMENT — PAIN DESCRIPTION - ORIENTATION
ORIENTATION: LOWER
ORIENTATION: LOWER

## 2025-08-29 ASSESSMENT — PAIN - FUNCTIONAL ASSESSMENT
PAIN_FUNCTIONAL_ASSESSMENT: 0-10

## 2025-08-29 ASSESSMENT — PAIN DESCRIPTION - LOCATION
LOCATION: RECTUM
LOCATION: ABDOMEN
LOCATION: ABDOMEN
LOCATION: RECTUM

## 2025-08-29 ASSESSMENT — PAIN DESCRIPTION - DESCRIPTORS
DESCRIPTORS: SORE
DESCRIPTORS: ACHING;DISCOMFORT
DESCRIPTORS: ACHING;DISCOMFORT
DESCRIPTORS: SORE

## 2025-08-29 ASSESSMENT — PAIN SCALES - GENERAL
PAINLEVEL_OUTOF10: 8
PAINLEVEL_OUTOF10: 7
PAINLEVEL_OUTOF10: 3
PAINLEVEL_OUTOF10: 5
PAINLEVEL_OUTOF10: 8

## 2025-08-29 ASSESSMENT — LIFESTYLE VARIABLES: SMOKING_STATUS: 1

## 2025-08-29 ASSESSMENT — PAIN DESCRIPTION - PAIN TYPE: TYPE: SURGICAL PAIN;ACUTE PAIN

## 2025-08-30 PROBLEM — F41.9 ANXIETY DISORDER: Status: RESOLVED | Noted: 2022-11-18 | Resolved: 2025-08-30

## 2025-08-30 PROBLEM — F32.A DEPRESSION: Status: RESOLVED | Noted: 2022-11-18 | Resolved: 2025-08-30

## 2025-08-30 PROBLEM — N18.31 CHRONIC RENAL FAILURE, STAGE 3A (HCC): Status: RESOLVED | Noted: 2023-09-28 | Resolved: 2025-08-30

## 2025-08-30 LAB
ANION GAP SERPL CALCULATED.3IONS-SCNC: 13 MMOL/L (ref 9–16)
BASOPHILS # BLD: <0.03 K/UL (ref 0–0.2)
BASOPHILS NFR BLD: 0 % (ref 0–2)
BUN SERPL-MCNC: 20 MG/DL (ref 8–23)
CA-I BLD-SCNC: 1.17 MMOL/L (ref 1.13–1.33)
CALCIUM SERPL-MCNC: 8.8 MG/DL (ref 8.6–10.4)
CHLORIDE SERPL-SCNC: 106 MMOL/L (ref 98–107)
CO2 SERPL-SCNC: 21 MMOL/L (ref 20–31)
CREAT SERPL-MCNC: 1.1 MG/DL (ref 0.6–0.9)
CREAT UR-MCNC: 281 MG/DL (ref 28–217)
EOSINOPHIL # BLD: <0.03 K/UL (ref 0–0.44)
EOSINOPHILS RELATIVE PERCENT: 0 % (ref 1–4)
ERYTHROCYTE [DISTWIDTH] IN BLOOD BY AUTOMATED COUNT: 13.5 % (ref 11.8–14.4)
GFR, ESTIMATED: 56 ML/MIN/1.73M2
GLUCOSE SERPL-MCNC: 129 MG/DL (ref 74–99)
HCT VFR BLD AUTO: 34.4 % (ref 36.3–47.1)
HCT VFR BLD AUTO: 37.3 % (ref 36.3–47.1)
HGB BLD-MCNC: 11.2 G/DL (ref 11.9–15.1)
HGB BLD-MCNC: 12.3 G/DL (ref 11.9–15.1)
IMM GRANULOCYTES # BLD AUTO: 0.08 K/UL (ref 0–0.3)
IMM GRANULOCYTES NFR BLD: 0 %
LYMPHOCYTES NFR BLD: 1.31 K/UL (ref 1.1–3.7)
LYMPHOCYTES RELATIVE PERCENT: 7 % (ref 24–43)
MAGNESIUM SERPL-MCNC: 2.2 MG/DL (ref 1.6–2.4)
MCH RBC QN AUTO: 29.6 PG (ref 25.2–33.5)
MCHC RBC AUTO-ENTMCNC: 33 G/DL (ref 28.4–34.8)
MCV RBC AUTO: 89.9 FL (ref 82.6–102.9)
MONOCYTES NFR BLD: 1.08 K/UL (ref 0.1–1.2)
MONOCYTES NFR BLD: 6 % (ref 3–12)
NEUTROPHILS NFR BLD: 87 % (ref 36–65)
NEUTS SEG NFR BLD: 16.17 K/UL (ref 1.5–8.1)
NRBC BLD-RTO: 0 PER 100 WBC
PHOSPHATE SERPL-MCNC: 4.3 MG/DL (ref 2.5–4.5)
PLATELET # BLD AUTO: 233 K/UL (ref 138–453)
PMV BLD AUTO: 10.7 FL (ref 8.1–13.5)
POTASSIUM SERPL-SCNC: 4.1 MMOL/L (ref 3.7–5.3)
RBC # BLD AUTO: 4.15 M/UL (ref 3.95–5.11)
SODIUM SERPL-SCNC: 140 MMOL/L (ref 136–145)
SODIUM UR-SCNC: <20 MMOL/L
WBC OTHER # BLD: 18.7 K/UL (ref 3.5–11.3)

## 2025-08-30 PROCEDURE — 80048 BASIC METABOLIC PNL TOTAL CA: CPT

## 2025-08-30 PROCEDURE — 84100 ASSAY OF PHOSPHORUS: CPT

## 2025-08-30 PROCEDURE — 97162 PT EVAL MOD COMPLEX 30 MIN: CPT

## 2025-08-30 PROCEDURE — 6360000002 HC RX W HCPCS

## 2025-08-30 PROCEDURE — 97530 THERAPEUTIC ACTIVITIES: CPT

## 2025-08-30 PROCEDURE — 6370000000 HC RX 637 (ALT 250 FOR IP)

## 2025-08-30 PROCEDURE — 82570 ASSAY OF URINE CREATININE: CPT

## 2025-08-30 PROCEDURE — 82330 ASSAY OF CALCIUM: CPT

## 2025-08-30 PROCEDURE — 84300 ASSAY OF URINE SODIUM: CPT

## 2025-08-30 PROCEDURE — 83735 ASSAY OF MAGNESIUM: CPT

## 2025-08-30 PROCEDURE — 36415 COLL VENOUS BLD VENIPUNCTURE: CPT

## 2025-08-30 PROCEDURE — 2500000003 HC RX 250 WO HCPCS

## 2025-08-30 PROCEDURE — 85025 COMPLETE CBC W/AUTO DIFF WBC: CPT

## 2025-08-30 PROCEDURE — 6360000002 HC RX W HCPCS: Performed by: SURGERY

## 2025-08-30 PROCEDURE — 51798 US URINE CAPACITY MEASURE: CPT

## 2025-08-30 PROCEDURE — 85018 HEMOGLOBIN: CPT

## 2025-08-30 PROCEDURE — 2580000003 HC RX 258

## 2025-08-30 PROCEDURE — 85014 HEMATOCRIT: CPT

## 2025-08-30 PROCEDURE — 97166 OT EVAL MOD COMPLEX 45 MIN: CPT

## 2025-08-30 PROCEDURE — 2000000000 HC ICU R&B

## 2025-08-30 PROCEDURE — 99232 SBSQ HOSP IP/OBS MODERATE 35: CPT | Performed by: STUDENT IN AN ORGANIZED HEALTH CARE EDUCATION/TRAINING PROGRAM

## 2025-08-30 RX ORDER — SODIUM CHLORIDE, SODIUM LACTATE, POTASSIUM CHLORIDE, AND CALCIUM CHLORIDE .6; .31; .03; .02 G/100ML; G/100ML; G/100ML; G/100ML
500 INJECTION, SOLUTION INTRAVENOUS ONCE
Status: DISCONTINUED | OUTPATIENT
Start: 2025-08-30 | End: 2025-09-02 | Stop reason: HOSPADM

## 2025-08-30 RX ORDER — SODIUM CHLORIDE, SODIUM LACTATE, POTASSIUM CHLORIDE, AND CALCIUM CHLORIDE .6; .31; .03; .02 G/100ML; G/100ML; G/100ML; G/100ML
1000 INJECTION, SOLUTION INTRAVENOUS ONCE
Status: COMPLETED | OUTPATIENT
Start: 2025-08-30 | End: 2025-08-31

## 2025-08-30 RX ORDER — METHOCARBAMOL 750 MG/1
750 TABLET, FILM COATED ORAL EVERY 6 HOURS
Status: DISCONTINUED | OUTPATIENT
Start: 2025-08-30 | End: 2025-09-02 | Stop reason: HOSPADM

## 2025-08-30 RX ORDER — KETOROLAC TROMETHAMINE 30 MG/ML
30 INJECTION, SOLUTION INTRAMUSCULAR; INTRAVENOUS EVERY 6 HOURS
Status: COMPLETED | OUTPATIENT
Start: 2025-08-30 | End: 2025-09-01

## 2025-08-30 RX ORDER — HEPARIN SODIUM 5000 [USP'U]/ML
5000 INJECTION, SOLUTION INTRAVENOUS; SUBCUTANEOUS EVERY 8 HOURS SCHEDULED
Status: DISCONTINUED | OUTPATIENT
Start: 2025-08-31 | End: 2025-09-02 | Stop reason: HOSPADM

## 2025-08-30 RX ORDER — LISINOPRIL 20 MG/1
20 TABLET ORAL DAILY
Status: DISCONTINUED | OUTPATIENT
Start: 2025-08-31 | End: 2025-09-01

## 2025-08-30 RX ORDER — SODIUM CHLORIDE, SODIUM LACTATE, POTASSIUM CHLORIDE, AND CALCIUM CHLORIDE .6; .31; .03; .02 G/100ML; G/100ML; G/100ML; G/100ML
1000 INJECTION, SOLUTION INTRAVENOUS ONCE
Status: DISCONTINUED | OUTPATIENT
Start: 2025-08-30 | End: 2025-09-02 | Stop reason: HOSPADM

## 2025-08-30 RX ORDER — PROMETHAZINE HYDROCHLORIDE 25 MG/ML
6.25 INJECTION, SOLUTION INTRAMUSCULAR; INTRAVENOUS EVERY 4 HOURS PRN
Status: DISCONTINUED | OUTPATIENT
Start: 2025-08-30 | End: 2025-09-02 | Stop reason: HOSPADM

## 2025-08-30 RX ORDER — PROMETHAZINE HYDROCHLORIDE 25 MG/ML
6.25 INJECTION, SOLUTION INTRAMUSCULAR; INTRAVENOUS ONCE
Status: COMPLETED | OUTPATIENT
Start: 2025-08-30 | End: 2025-08-30

## 2025-08-30 RX ADMIN — ASPIRIN 81 MG: 81 TABLET, COATED ORAL at 12:35

## 2025-08-30 RX ADMIN — SODIUM CHLORIDE, SODIUM LACTATE, POTASSIUM CHLORIDE, AND CALCIUM CHLORIDE 500 ML: .6; .31; .03; .02 INJECTION, SOLUTION INTRAVENOUS at 06:16

## 2025-08-30 RX ADMIN — HYDROCORTISONE 2.5%: 25 CREAM TOPICAL at 21:04

## 2025-08-30 RX ADMIN — METHOCARBAMOL 750 MG: 750 TABLET ORAL at 19:37

## 2025-08-30 RX ADMIN — ONDANSETRON 4 MG: 2 INJECTION, SOLUTION INTRAMUSCULAR; INTRAVENOUS at 04:50

## 2025-08-30 RX ADMIN — FENTANYL CITRATE 50 MCG: 50 INJECTION INTRAMUSCULAR; INTRAVENOUS at 21:11

## 2025-08-30 RX ADMIN — METHOCARBAMOL 750 MG: 750 TABLET ORAL at 12:34

## 2025-08-30 RX ADMIN — FENTANYL CITRATE 50 MCG: 50 INJECTION INTRAMUSCULAR; INTRAVENOUS at 06:14

## 2025-08-30 RX ADMIN — PROMETHAZINE HYDROCHLORIDE 6.25 MG: 25 INJECTION INTRAMUSCULAR; INTRAVENOUS at 06:11

## 2025-08-30 RX ADMIN — HYDROCORTISONE 2.5%: 25 CREAM TOPICAL at 09:00

## 2025-08-30 RX ADMIN — KETOROLAC TROMETHAMINE 30 MG: 30 INJECTION, SOLUTION INTRAMUSCULAR; INTRAVENOUS at 19:37

## 2025-08-30 RX ADMIN — KETOROLAC TROMETHAMINE 30 MG: 30 INJECTION, SOLUTION INTRAMUSCULAR; INTRAVENOUS at 12:34

## 2025-08-30 RX ADMIN — FENTANYL CITRATE 50 MCG: 50 INJECTION INTRAMUSCULAR; INTRAVENOUS at 10:33

## 2025-08-30 RX ADMIN — SODIUM CHLORIDE, PRESERVATIVE FREE 10 ML: 5 INJECTION INTRAVENOUS at 09:09

## 2025-08-30 RX ADMIN — KETOROLAC TROMETHAMINE 30 MG: 30 INJECTION, SOLUTION INTRAMUSCULAR; INTRAVENOUS at 09:08

## 2025-08-30 RX ADMIN — SODIUM CHLORIDE, PRESERVATIVE FREE 10 ML: 5 INJECTION INTRAVENOUS at 19:38

## 2025-08-30 RX ADMIN — SODIUM CHLORIDE, SODIUM LACTATE, POTASSIUM CHLORIDE, AND CALCIUM CHLORIDE 1000 ML: .6; .31; .03; .02 INJECTION, SOLUTION INTRAVENOUS at 22:47

## 2025-08-30 RX ADMIN — SODIUM CHLORIDE, SODIUM LACTATE, POTASSIUM CHLORIDE, AND CALCIUM CHLORIDE: .6; .31; .03; .02 INJECTION, SOLUTION INTRAVENOUS at 14:11

## 2025-08-30 RX ADMIN — SODIUM CHLORIDE, SODIUM LACTATE, POTASSIUM CHLORIDE, AND CALCIUM CHLORIDE: .6; .31; .03; .02 INJECTION, SOLUTION INTRAVENOUS at 21:06

## 2025-08-30 RX ADMIN — FENTANYL CITRATE 50 MCG: 50 INJECTION INTRAMUSCULAR; INTRAVENOUS at 01:03

## 2025-08-30 ASSESSMENT — PAIN DESCRIPTION - PAIN TYPE
TYPE: ACUTE PAIN;SURGICAL PAIN

## 2025-08-30 ASSESSMENT — PAIN - FUNCTIONAL ASSESSMENT
PAIN_FUNCTIONAL_ASSESSMENT: 0-10

## 2025-08-30 ASSESSMENT — PAIN DESCRIPTION - LOCATION
LOCATION: ABDOMEN;GENERALIZED
LOCATION: ABDOMEN;THROAT
LOCATION: GENERALIZED;ABDOMEN
LOCATION: ABDOMEN
LOCATION: ABDOMEN;GENERALIZED
LOCATION: ABDOMEN

## 2025-08-30 ASSESSMENT — PAIN DESCRIPTION - ORIENTATION
ORIENTATION: LOWER

## 2025-08-30 ASSESSMENT — PAIN SCALES - GENERAL
PAINLEVEL_OUTOF10: 8
PAINLEVEL_OUTOF10: 7
PAINLEVEL_OUTOF10: 5
PAINLEVEL_OUTOF10: 6
PAINLEVEL_OUTOF10: 7
PAINLEVEL_OUTOF10: 8
PAINLEVEL_OUTOF10: 7
PAINLEVEL_OUTOF10: 5
PAINLEVEL_OUTOF10: 8
PAINLEVEL_OUTOF10: 4

## 2025-08-30 ASSESSMENT — PAIN DESCRIPTION - DESCRIPTORS
DESCRIPTORS: SORE

## 2025-08-31 LAB
ANION GAP SERPL CALCULATED.3IONS-SCNC: 11 MMOL/L (ref 9–16)
BASOPHILS # BLD: <0.03 K/UL (ref 0–0.2)
BASOPHILS NFR BLD: 0 % (ref 0–2)
BUN SERPL-MCNC: 19 MG/DL (ref 8–23)
CA-I BLD-SCNC: 1.08 MMOL/L (ref 1.13–1.33)
CALCIUM SERPL-MCNC: 8.7 MG/DL (ref 8.6–10.4)
CHLORIDE SERPL-SCNC: 107 MMOL/L (ref 98–107)
CO2 SERPL-SCNC: 23 MMOL/L (ref 20–31)
CREAT SERPL-MCNC: 1.1 MG/DL (ref 0.6–0.9)
EOSINOPHIL # BLD: <0.03 K/UL (ref 0–0.44)
EOSINOPHILS RELATIVE PERCENT: 0 % (ref 1–4)
ERYTHROCYTE [DISTWIDTH] IN BLOOD BY AUTOMATED COUNT: 13.8 % (ref 11.8–14.4)
GFR, ESTIMATED: 56 ML/MIN/1.73M2
GLUCOSE SERPL-MCNC: 120 MG/DL (ref 74–99)
HCT VFR BLD AUTO: 32.1 % (ref 36.3–47.1)
HGB BLD-MCNC: 10.1 G/DL (ref 11.9–15.1)
IMM GRANULOCYTES # BLD AUTO: 0.07 K/UL (ref 0–0.3)
IMM GRANULOCYTES NFR BLD: 1 %
LYMPHOCYTES NFR BLD: 1.66 K/UL (ref 1.1–3.7)
LYMPHOCYTES RELATIVE PERCENT: 12 % (ref 24–43)
MAGNESIUM SERPL-MCNC: 1.7 MG/DL (ref 1.6–2.4)
MCH RBC QN AUTO: 30 PG (ref 25.2–33.5)
MCHC RBC AUTO-ENTMCNC: 31.5 G/DL (ref 28.4–34.8)
MCV RBC AUTO: 95.3 FL (ref 82.6–102.9)
MONOCYTES NFR BLD: 0.99 K/UL (ref 0.1–1.2)
MONOCYTES NFR BLD: 7 % (ref 3–12)
NEUTROPHILS NFR BLD: 80 % (ref 36–65)
NEUTS SEG NFR BLD: 10.78 K/UL (ref 1.5–8.1)
NRBC BLD-RTO: 0 PER 100 WBC
PHOSPHATE SERPL-MCNC: 2.4 MG/DL (ref 2.5–4.5)
PLATELET # BLD AUTO: 143 K/UL (ref 138–453)
PMV BLD AUTO: 11.4 FL (ref 8.1–13.5)
POTASSIUM SERPL-SCNC: 4.3 MMOL/L (ref 3.7–5.3)
RBC # BLD AUTO: 3.37 M/UL (ref 3.95–5.11)
SODIUM SERPL-SCNC: 141 MMOL/L (ref 136–145)
WBC OTHER # BLD: 13.5 K/UL (ref 3.5–11.3)

## 2025-08-31 PROCEDURE — 6360000002 HC RX W HCPCS: Performed by: SURGERY

## 2025-08-31 PROCEDURE — 6370000000 HC RX 637 (ALT 250 FOR IP): Performed by: STUDENT IN AN ORGANIZED HEALTH CARE EDUCATION/TRAINING PROGRAM

## 2025-08-31 PROCEDURE — 6370000000 HC RX 637 (ALT 250 FOR IP)

## 2025-08-31 PROCEDURE — 2500000003 HC RX 250 WO HCPCS

## 2025-08-31 PROCEDURE — 80048 BASIC METABOLIC PNL TOTAL CA: CPT

## 2025-08-31 PROCEDURE — 85025 COMPLETE CBC W/AUTO DIFF WBC: CPT

## 2025-08-31 PROCEDURE — 36415 COLL VENOUS BLD VENIPUNCTURE: CPT

## 2025-08-31 PROCEDURE — 2580000003 HC RX 258

## 2025-08-31 PROCEDURE — 6360000002 HC RX W HCPCS

## 2025-08-31 PROCEDURE — 99232 SBSQ HOSP IP/OBS MODERATE 35: CPT | Performed by: STUDENT IN AN ORGANIZED HEALTH CARE EDUCATION/TRAINING PROGRAM

## 2025-08-31 PROCEDURE — 2060000000 HC ICU INTERMEDIATE R&B

## 2025-08-31 PROCEDURE — 82330 ASSAY OF CALCIUM: CPT

## 2025-08-31 PROCEDURE — 84100 ASSAY OF PHOSPHORUS: CPT

## 2025-08-31 PROCEDURE — 51798 US URINE CAPACITY MEASURE: CPT

## 2025-08-31 PROCEDURE — 83735 ASSAY OF MAGNESIUM: CPT

## 2025-08-31 PROCEDURE — 94761 N-INVAS EAR/PLS OXIMETRY MLT: CPT

## 2025-08-31 RX ADMIN — METHOCARBAMOL 750 MG: 750 TABLET ORAL at 13:33

## 2025-08-31 RX ADMIN — METHOCARBAMOL 750 MG: 750 TABLET ORAL at 05:58

## 2025-08-31 RX ADMIN — METHOCARBAMOL 750 MG: 750 TABLET ORAL at 00:44

## 2025-08-31 RX ADMIN — SODIUM CHLORIDE, SODIUM LACTATE, POTASSIUM CHLORIDE, AND CALCIUM CHLORIDE: .6; .31; .03; .02 INJECTION, SOLUTION INTRAVENOUS at 03:25

## 2025-08-31 RX ADMIN — HEPARIN SODIUM 5000 UNITS: 5000 INJECTION, SOLUTION INTRAVENOUS; SUBCUTANEOUS at 05:58

## 2025-08-31 RX ADMIN — SODIUM CHLORIDE, SODIUM LACTATE, POTASSIUM CHLORIDE, AND CALCIUM CHLORIDE: .6; .31; .03; .02 INJECTION, SOLUTION INTRAVENOUS at 11:27

## 2025-08-31 RX ADMIN — EZETIMIBE 10 MG: 10 TABLET ORAL at 07:48

## 2025-08-31 RX ADMIN — SODIUM CHLORIDE, PRESERVATIVE FREE 10 ML: 5 INJECTION INTRAVENOUS at 07:49

## 2025-08-31 RX ADMIN — KETOROLAC TROMETHAMINE 30 MG: 30 INJECTION, SOLUTION INTRAMUSCULAR; INTRAVENOUS at 00:44

## 2025-08-31 RX ADMIN — HEPARIN SODIUM 5000 UNITS: 5000 INJECTION, SOLUTION INTRAVENOUS; SUBCUTANEOUS at 22:30

## 2025-08-31 RX ADMIN — FENTANYL CITRATE 50 MCG: 50 INJECTION INTRAMUSCULAR; INTRAVENOUS at 15:19

## 2025-08-31 RX ADMIN — LISINOPRIL 20 MG: 20 TABLET ORAL at 07:48

## 2025-08-31 RX ADMIN — ASPIRIN 81 MG: 81 TABLET, COATED ORAL at 07:48

## 2025-08-31 RX ADMIN — KETOROLAC TROMETHAMINE 30 MG: 30 INJECTION, SOLUTION INTRAMUSCULAR; INTRAVENOUS at 13:33

## 2025-08-31 RX ADMIN — HEPARIN SODIUM 5000 UNITS: 5000 INJECTION, SOLUTION INTRAVENOUS; SUBCUTANEOUS at 00:44

## 2025-08-31 RX ADMIN — KETOROLAC TROMETHAMINE 30 MG: 30 INJECTION, SOLUTION INTRAMUSCULAR; INTRAVENOUS at 20:50

## 2025-08-31 RX ADMIN — METHOCARBAMOL 750 MG: 750 TABLET ORAL at 20:50

## 2025-08-31 RX ADMIN — ATORVASTATIN CALCIUM 80 MG: 80 TABLET, FILM COATED ORAL at 07:48

## 2025-08-31 RX ADMIN — FENTANYL CITRATE 50 MCG: 50 INJECTION INTRAMUSCULAR; INTRAVENOUS at 07:46

## 2025-08-31 RX ADMIN — KETOROLAC TROMETHAMINE 30 MG: 30 INJECTION, SOLUTION INTRAMUSCULAR; INTRAVENOUS at 05:57

## 2025-08-31 RX ADMIN — SODIUM CHLORIDE, PRESERVATIVE FREE 10 ML: 5 INJECTION INTRAVENOUS at 23:11

## 2025-08-31 RX ADMIN — HEPARIN SODIUM 5000 UNITS: 5000 INJECTION, SOLUTION INTRAVENOUS; SUBCUTANEOUS at 13:33

## 2025-08-31 ASSESSMENT — PAIN SCALES - GENERAL
PAINLEVEL_OUTOF10: 5
PAINLEVEL_OUTOF10: 8
PAINLEVEL_OUTOF10: 6
PAINLEVEL_OUTOF10: 4
PAINLEVEL_OUTOF10: 8
PAINLEVEL_OUTOF10: 7
PAINLEVEL_OUTOF10: 4
PAINLEVEL_OUTOF10: 5

## 2025-08-31 ASSESSMENT — PAIN - FUNCTIONAL ASSESSMENT
PAIN_FUNCTIONAL_ASSESSMENT: 0-10

## 2025-08-31 ASSESSMENT — PAIN DESCRIPTION - LOCATION
LOCATION: ABDOMEN;GENERALIZED
LOCATION: GENERALIZED
LOCATION: GENERALIZED;ABDOMEN
LOCATION: ABDOMEN;GENERALIZED
LOCATION: GENERALIZED
LOCATION: ABDOMEN;GENERALIZED

## 2025-08-31 ASSESSMENT — PAIN DESCRIPTION - DESCRIPTORS
DESCRIPTORS: SORE

## 2025-08-31 ASSESSMENT — PAIN DESCRIPTION - PAIN TYPE
TYPE: ACUTE PAIN;SURGICAL PAIN

## 2025-09-01 LAB
ABO/RH: NORMAL
ANION GAP SERPL CALCULATED.3IONS-SCNC: 14 MMOL/L (ref 9–16)
ANTIBODY SCREEN: NEGATIVE
ARM BAND NUMBER: NORMAL
BASOPHILS # BLD: <0.03 K/UL (ref 0–0.2)
BASOPHILS NFR BLD: 0 % (ref 0–2)
BLOOD BANK DISPENSE STATUS: NORMAL
BLOOD BANK SAMPLE EXPIRATION: NORMAL
BPU ID: NORMAL
BUN SERPL-MCNC: 15 MG/DL (ref 8–23)
CA-I BLD-SCNC: 1.13 MMOL/L (ref 1.13–1.33)
CALCIUM SERPL-MCNC: 8.4 MG/DL (ref 8.6–10.4)
CHLORIDE SERPL-SCNC: 105 MMOL/L (ref 98–107)
CO2 SERPL-SCNC: 21 MMOL/L (ref 20–31)
COMPONENT: NORMAL
CREAT SERPL-MCNC: 0.8 MG/DL (ref 0.6–0.9)
CROSSMATCH RESULT: NORMAL
EOSINOPHIL # BLD: 0.14 K/UL (ref 0–0.44)
EOSINOPHILS RELATIVE PERCENT: 1 % (ref 1–4)
ERYTHROCYTE [DISTWIDTH] IN BLOOD BY AUTOMATED COUNT: 13.6 % (ref 11.8–14.4)
GFR, ESTIMATED: 82 ML/MIN/1.73M2
GLUCOSE SERPL-MCNC: 99 MG/DL (ref 74–99)
HCT VFR BLD AUTO: 29.8 % (ref 36.3–47.1)
HGB BLD-MCNC: 10 G/DL (ref 11.9–15.1)
IMM GRANULOCYTES # BLD AUTO: 0.05 K/UL (ref 0–0.3)
IMM GRANULOCYTES NFR BLD: 0 %
LYMPHOCYTES NFR BLD: 2 K/UL (ref 1.1–3.7)
LYMPHOCYTES RELATIVE PERCENT: 17 % (ref 24–43)
MAGNESIUM SERPL-MCNC: 1.5 MG/DL (ref 1.6–2.4)
MCH RBC QN AUTO: 29.9 PG (ref 25.2–33.5)
MCHC RBC AUTO-ENTMCNC: 33.6 G/DL (ref 28.4–34.8)
MCV RBC AUTO: 89.2 FL (ref 82.6–102.9)
MONOCYTES NFR BLD: 0.85 K/UL (ref 0.1–1.2)
MONOCYTES NFR BLD: 7 % (ref 3–12)
NEUTROPHILS NFR BLD: 74 % (ref 36–65)
NEUTS SEG NFR BLD: 8.71 K/UL (ref 1.5–8.1)
NRBC BLD-RTO: 0 PER 100 WBC
PHOSPHATE SERPL-MCNC: 2 MG/DL (ref 2.5–4.5)
PHOSPHATE SERPL-MCNC: 2.6 MG/DL (ref 2.5–4.5)
PLATELET # BLD AUTO: 196 K/UL (ref 138–453)
PMV BLD AUTO: 11.3 FL (ref 8.1–13.5)
POTASSIUM SERPL-SCNC: 3.6 MMOL/L (ref 3.7–5.3)
RBC # BLD AUTO: 3.34 M/UL (ref 3.95–5.11)
SODIUM SERPL-SCNC: 140 MMOL/L (ref 136–145)
TRANSFUSION STATUS: NORMAL
UNIT DIVISION: 0
WBC OTHER # BLD: 11.8 K/UL (ref 3.5–11.3)

## 2025-09-01 PROCEDURE — 6370000000 HC RX 637 (ALT 250 FOR IP)

## 2025-09-01 PROCEDURE — 6370000000 HC RX 637 (ALT 250 FOR IP): Performed by: STUDENT IN AN ORGANIZED HEALTH CARE EDUCATION/TRAINING PROGRAM

## 2025-09-01 PROCEDURE — 99233 SBSQ HOSP IP/OBS HIGH 50: CPT | Performed by: STUDENT IN AN ORGANIZED HEALTH CARE EDUCATION/TRAINING PROGRAM

## 2025-09-01 PROCEDURE — 2500000003 HC RX 250 WO HCPCS

## 2025-09-01 PROCEDURE — 36415 COLL VENOUS BLD VENIPUNCTURE: CPT

## 2025-09-01 PROCEDURE — 82330 ASSAY OF CALCIUM: CPT

## 2025-09-01 PROCEDURE — 2580000003 HC RX 258: Performed by: STUDENT IN AN ORGANIZED HEALTH CARE EDUCATION/TRAINING PROGRAM

## 2025-09-01 PROCEDURE — 6360000002 HC RX W HCPCS

## 2025-09-01 PROCEDURE — 94761 N-INVAS EAR/PLS OXIMETRY MLT: CPT

## 2025-09-01 PROCEDURE — 2060000000 HC ICU INTERMEDIATE R&B

## 2025-09-01 PROCEDURE — 83735 ASSAY OF MAGNESIUM: CPT

## 2025-09-01 PROCEDURE — 2500000003 HC RX 250 WO HCPCS: Performed by: STUDENT IN AN ORGANIZED HEALTH CARE EDUCATION/TRAINING PROGRAM

## 2025-09-01 PROCEDURE — 80048 BASIC METABOLIC PNL TOTAL CA: CPT

## 2025-09-01 PROCEDURE — 6360000002 HC RX W HCPCS: Performed by: STUDENT IN AN ORGANIZED HEALTH CARE EDUCATION/TRAINING PROGRAM

## 2025-09-01 PROCEDURE — 6360000002 HC RX W HCPCS: Performed by: SURGERY

## 2025-09-01 PROCEDURE — 85025 COMPLETE CBC W/AUTO DIFF WBC: CPT

## 2025-09-01 PROCEDURE — 84100 ASSAY OF PHOSPHORUS: CPT

## 2025-09-01 RX ORDER — POTASSIUM CHLORIDE 7.45 MG/ML
10 INJECTION INTRAVENOUS PRN
Status: DISCONTINUED | OUTPATIENT
Start: 2025-09-01 | End: 2025-09-02 | Stop reason: HOSPADM

## 2025-09-01 RX ORDER — LISINOPRIL 20 MG/1
40 TABLET ORAL DAILY
Status: DISCONTINUED | OUTPATIENT
Start: 2025-09-02 | End: 2025-09-02 | Stop reason: HOSPADM

## 2025-09-01 RX ORDER — POTASSIUM CHLORIDE 1500 MG/1
40 TABLET, EXTENDED RELEASE ORAL PRN
Status: DISCONTINUED | OUTPATIENT
Start: 2025-09-01 | End: 2025-09-02 | Stop reason: HOSPADM

## 2025-09-01 RX ORDER — MAGNESIUM SULFATE 1 G/100ML
1000 INJECTION INTRAVENOUS PRN
Status: DISCONTINUED | OUTPATIENT
Start: 2025-09-01 | End: 2025-09-02 | Stop reason: HOSPADM

## 2025-09-01 RX ORDER — AMLODIPINE BESYLATE 10 MG/1
10 TABLET ORAL DAILY
Status: DISCONTINUED | OUTPATIENT
Start: 2025-09-01 | End: 2025-09-02 | Stop reason: HOSPADM

## 2025-09-01 RX ORDER — SODIUM CHLORIDE, SODIUM LACTATE, POTASSIUM CHLORIDE, CALCIUM CHLORIDE 600; 310; 30; 20 MG/100ML; MG/100ML; MG/100ML; MG/100ML
INJECTION, SOLUTION INTRAVENOUS CONTINUOUS
Status: DISCONTINUED | OUTPATIENT
Start: 2025-09-01 | End: 2025-09-01

## 2025-09-01 RX ADMIN — HEPARIN SODIUM 5000 UNITS: 5000 INJECTION, SOLUTION INTRAVENOUS; SUBCUTANEOUS at 21:13

## 2025-09-01 RX ADMIN — FENTANYL CITRATE 75 MCG: 50 INJECTION INTRAMUSCULAR; INTRAVENOUS at 16:11

## 2025-09-01 RX ADMIN — MAGNESIUM SULFATE HEPTAHYDRATE 1000 MG: 1 INJECTION, SOLUTION INTRAVENOUS at 10:31

## 2025-09-01 RX ADMIN — HEPARIN SODIUM 5000 UNITS: 5000 INJECTION, SOLUTION INTRAVENOUS; SUBCUTANEOUS at 13:47

## 2025-09-01 RX ADMIN — ATORVASTATIN CALCIUM 80 MG: 80 TABLET, FILM COATED ORAL at 08:38

## 2025-09-01 RX ADMIN — SODIUM CHLORIDE, SODIUM LACTATE, POTASSIUM CHLORIDE, AND CALCIUM CHLORIDE: .6; .31; .03; .02 INJECTION, SOLUTION INTRAVENOUS at 09:05

## 2025-09-01 RX ADMIN — METHOCARBAMOL 750 MG: 750 TABLET ORAL at 01:27

## 2025-09-01 RX ADMIN — METHOCARBAMOL 750 MG: 750 TABLET ORAL at 08:00

## 2025-09-01 RX ADMIN — SODIUM CHLORIDE, PRESERVATIVE FREE 10 ML: 5 INJECTION INTRAVENOUS at 08:38

## 2025-09-01 RX ADMIN — SODIUM PHOSPHATE, MONOBASIC, MONOHYDRATE AND SODIUM PHOSPHATE, DIBASIC, ANHYDROUS 11.82 MMOL: 276; 142 INJECTION, SOLUTION INTRAVENOUS at 12:42

## 2025-09-01 RX ADMIN — Medication 10 MG: at 19:38

## 2025-09-01 RX ADMIN — METHOCARBAMOL 750 MG: 750 TABLET ORAL at 18:58

## 2025-09-01 RX ADMIN — HYDRALAZINE HYDROCHLORIDE 10 MG: 20 INJECTION INTRAMUSCULAR; INTRAVENOUS at 06:08

## 2025-09-01 RX ADMIN — LISINOPRIL 20 MG: 20 TABLET ORAL at 08:38

## 2025-09-01 RX ADMIN — AMLODIPINE BESYLATE 10 MG: 10 TABLET ORAL at 18:55

## 2025-09-01 RX ADMIN — METHOCARBAMOL 750 MG: 750 TABLET ORAL at 11:30

## 2025-09-01 RX ADMIN — HYDRALAZINE HYDROCHLORIDE 10 MG: 20 INJECTION INTRAMUSCULAR; INTRAVENOUS at 16:05

## 2025-09-01 RX ADMIN — FENTANYL CITRATE 75 MCG: 50 INJECTION INTRAMUSCULAR; INTRAVENOUS at 19:37

## 2025-09-01 RX ADMIN — HEPARIN SODIUM 5000 UNITS: 5000 INJECTION, SOLUTION INTRAVENOUS; SUBCUTANEOUS at 05:58

## 2025-09-01 RX ADMIN — Medication 10 MG: at 12:47

## 2025-09-01 RX ADMIN — SODIUM CHLORIDE, SODIUM LACTATE, POTASSIUM CHLORIDE, AND CALCIUM CHLORIDE: .6; .31; .03; .02 INJECTION, SOLUTION INTRAVENOUS at 13:48

## 2025-09-01 RX ADMIN — KETOROLAC TROMETHAMINE 30 MG: 30 INJECTION, SOLUTION INTRAMUSCULAR; INTRAVENOUS at 01:27

## 2025-09-01 RX ADMIN — EZETIMIBE 10 MG: 10 TABLET ORAL at 08:38

## 2025-09-01 RX ADMIN — SODIUM CHLORIDE, PRESERVATIVE FREE 10 ML: 5 INJECTION INTRAVENOUS at 19:42

## 2025-09-01 RX ADMIN — MAGNESIUM SULFATE HEPTAHYDRATE 1000 MG: 1 INJECTION, SOLUTION INTRAVENOUS at 09:04

## 2025-09-01 RX ADMIN — FENTANYL CITRATE 75 MCG: 50 INJECTION INTRAMUSCULAR; INTRAVENOUS at 09:48

## 2025-09-01 RX ADMIN — HYDROCORTISONE 2.5%: 25 CREAM TOPICAL at 21:13

## 2025-09-01 RX ADMIN — ONDANSETRON 4 MG: 4 TABLET, ORALLY DISINTEGRATING ORAL at 18:53

## 2025-09-01 RX ADMIN — ASPIRIN 81 MG: 81 TABLET, COATED ORAL at 08:38

## 2025-09-01 ASSESSMENT — PAIN SCALES - GENERAL
PAINLEVEL_OUTOF10: 8
PAINLEVEL_OUTOF10: 7
PAINLEVEL_OUTOF10: 7
PAINLEVEL_OUTOF10: 8
PAINLEVEL_OUTOF10: 5
PAINLEVEL_OUTOF10: 0
PAINLEVEL_OUTOF10: 5
PAINLEVEL_OUTOF10: 9

## 2025-09-01 ASSESSMENT — PAIN DESCRIPTION - LOCATION
LOCATION: ABDOMEN

## 2025-09-01 ASSESSMENT — PAIN - FUNCTIONAL ASSESSMENT
PAIN_FUNCTIONAL_ASSESSMENT: 0-10

## 2025-09-01 ASSESSMENT — PAIN DESCRIPTION - DESCRIPTORS
DESCRIPTORS: ACHING

## 2025-09-01 ASSESSMENT — PAIN DESCRIPTION - ORIENTATION
ORIENTATION: MID;RIGHT;LEFT
ORIENTATION: RIGHT;LEFT;MID
ORIENTATION: RIGHT;LEFT;MID

## 2025-09-02 VITALS
OXYGEN SATURATION: 98 % | BODY MASS INDEX: 27.16 KG/M2 | TEMPERATURE: 98.7 F | WEIGHT: 163 LBS | DIASTOLIC BLOOD PRESSURE: 58 MMHG | HEART RATE: 86 BPM | SYSTOLIC BLOOD PRESSURE: 153 MMHG | HEIGHT: 65 IN | RESPIRATION RATE: 16 BRPM

## 2025-09-02 PROBLEM — I73.9 PAD (PERIPHERAL ARTERY DISEASE): Status: ACTIVE | Noted: 2025-09-02

## 2025-09-02 LAB
ANION GAP SERPL CALCULATED.3IONS-SCNC: 11 MMOL/L (ref 9–16)
BASOPHILS # BLD: 0.03 K/UL (ref 0–0.2)
BASOPHILS NFR BLD: 0 % (ref 0–2)
BUN SERPL-MCNC: 11 MG/DL (ref 8–23)
CA-I BLD-SCNC: 1.12 MMOL/L (ref 1.13–1.33)
CALCIUM SERPL-MCNC: 8.3 MG/DL (ref 8.6–10.4)
CHLORIDE SERPL-SCNC: 106 MMOL/L (ref 98–107)
CO2 SERPL-SCNC: 22 MMOL/L (ref 20–31)
CREAT SERPL-MCNC: 0.8 MG/DL (ref 0.6–0.9)
EOSINOPHIL # BLD: 0.1 K/UL (ref 0–0.44)
EOSINOPHILS RELATIVE PERCENT: 1 % (ref 1–4)
ERYTHROCYTE [DISTWIDTH] IN BLOOD BY AUTOMATED COUNT: 13.4 % (ref 11.8–14.4)
GFR, ESTIMATED: 82 ML/MIN/1.73M2
GLUCOSE SERPL-MCNC: 104 MG/DL (ref 74–99)
HCT VFR BLD AUTO: 30.8 % (ref 36.3–47.1)
HGB BLD-MCNC: 10.1 G/DL (ref 11.9–15.1)
IMM GRANULOCYTES # BLD AUTO: 0.06 K/UL (ref 0–0.3)
IMM GRANULOCYTES NFR BLD: 1 %
LYMPHOCYTES NFR BLD: 1.59 K/UL (ref 1.1–3.7)
LYMPHOCYTES RELATIVE PERCENT: 16 % (ref 24–43)
MAGNESIUM SERPL-MCNC: 1.6 MG/DL (ref 1.6–2.4)
MCH RBC QN AUTO: 29.8 PG (ref 25.2–33.5)
MCHC RBC AUTO-ENTMCNC: 32.8 G/DL (ref 28.4–34.8)
MCV RBC AUTO: 90.9 FL (ref 82.6–102.9)
MONOCYTES NFR BLD: 0.8 K/UL (ref 0.1–1.2)
MONOCYTES NFR BLD: 8 % (ref 3–12)
NEUTROPHILS NFR BLD: 74 % (ref 36–65)
NEUTS SEG NFR BLD: 7.09 K/UL (ref 1.5–8.1)
NRBC BLD-RTO: 0 PER 100 WBC
PHOSPHATE SERPL-MCNC: 3 MG/DL (ref 2.5–4.5)
PLATELET # BLD AUTO: 233 K/UL (ref 138–453)
PMV BLD AUTO: 11.4 FL (ref 8.1–13.5)
POTASSIUM SERPL-SCNC: 3.8 MMOL/L (ref 3.7–5.3)
RBC # BLD AUTO: 3.39 M/UL (ref 3.95–5.11)
SODIUM SERPL-SCNC: 139 MMOL/L (ref 136–145)
WBC OTHER # BLD: 9.7 K/UL (ref 3.5–11.3)

## 2025-09-02 PROCEDURE — 6370000000 HC RX 637 (ALT 250 FOR IP)

## 2025-09-02 PROCEDURE — 82330 ASSAY OF CALCIUM: CPT

## 2025-09-02 PROCEDURE — 99239 HOSP IP/OBS DSCHRG MGMT >30: CPT | Performed by: STUDENT IN AN ORGANIZED HEALTH CARE EDUCATION/TRAINING PROGRAM

## 2025-09-02 PROCEDURE — 6360000002 HC RX W HCPCS

## 2025-09-02 PROCEDURE — 6360000002 HC RX W HCPCS: Performed by: STUDENT IN AN ORGANIZED HEALTH CARE EDUCATION/TRAINING PROGRAM

## 2025-09-02 PROCEDURE — 80048 BASIC METABOLIC PNL TOTAL CA: CPT

## 2025-09-02 PROCEDURE — 2500000003 HC RX 250 WO HCPCS

## 2025-09-02 PROCEDURE — 83735 ASSAY OF MAGNESIUM: CPT

## 2025-09-02 PROCEDURE — 36415 COLL VENOUS BLD VENIPUNCTURE: CPT

## 2025-09-02 PROCEDURE — 94761 N-INVAS EAR/PLS OXIMETRY MLT: CPT

## 2025-09-02 PROCEDURE — 84100 ASSAY OF PHOSPHORUS: CPT

## 2025-09-02 PROCEDURE — 6370000000 HC RX 637 (ALT 250 FOR IP): Performed by: STUDENT IN AN ORGANIZED HEALTH CARE EDUCATION/TRAINING PROGRAM

## 2025-09-02 PROCEDURE — 85025 COMPLETE CBC W/AUTO DIFF WBC: CPT

## 2025-09-02 RX ORDER — HYDROCODONE BITARTRATE AND ACETAMINOPHEN 5; 325 MG/1; MG/1
1 TABLET ORAL EVERY 6 HOURS PRN
Status: DISCONTINUED | OUTPATIENT
Start: 2025-09-02 | End: 2025-09-02 | Stop reason: HOSPADM

## 2025-09-02 RX ORDER — AMLODIPINE BESYLATE 10 MG/1
10 TABLET ORAL DAILY
Qty: 30 TABLET | Refills: 3 | Status: SHIPPED | OUTPATIENT
Start: 2025-09-03

## 2025-09-02 RX ORDER — HYDROCODONE BITARTRATE AND ACETAMINOPHEN 5; 325 MG/1; MG/1
1 TABLET ORAL EVERY 6 HOURS PRN
Qty: 10 TABLET | Refills: 0 | Status: SHIPPED | OUTPATIENT
Start: 2025-09-02 | End: 2025-09-05 | Stop reason: SDUPTHER

## 2025-09-02 RX ORDER — ONDANSETRON 4 MG/1
4 TABLET, ORALLY DISINTEGRATING ORAL 3 TIMES DAILY PRN
Qty: 21 TABLET | Refills: 0 | Status: SHIPPED | OUTPATIENT
Start: 2025-09-02

## 2025-09-02 RX ORDER — HYDROXYZINE HYDROCHLORIDE 25 MG/1
25 TABLET, FILM COATED ORAL EVERY 8 HOURS PRN
Qty: 30 TABLET | Refills: 0 | Status: SHIPPED | OUTPATIENT
Start: 2025-09-02 | End: 2025-09-12

## 2025-09-02 RX ORDER — HYDROCORTISONE 25 MG/G
CREAM TOPICAL
Qty: 28 G | Refills: 0 | Status: SHIPPED | OUTPATIENT
Start: 2025-09-02 | End: 2025-09-16

## 2025-09-02 RX ORDER — METHOCARBAMOL 750 MG/1
750 TABLET, FILM COATED ORAL EVERY 6 HOURS
Qty: 40 TABLET | Refills: 0 | Status: SHIPPED | OUTPATIENT
Start: 2025-09-02 | End: 2025-09-12

## 2025-09-02 RX ADMIN — ASPIRIN 81 MG: 81 TABLET, COATED ORAL at 08:17

## 2025-09-02 RX ADMIN — AMLODIPINE BESYLATE 10 MG: 10 TABLET ORAL at 08:17

## 2025-09-02 RX ADMIN — METHOCARBAMOL 750 MG: 750 TABLET ORAL at 13:05

## 2025-09-02 RX ADMIN — FENTANYL CITRATE 75 MCG: 50 INJECTION INTRAMUSCULAR; INTRAVENOUS at 05:15

## 2025-09-02 RX ADMIN — SODIUM CHLORIDE, PRESERVATIVE FREE 10 ML: 5 INJECTION INTRAVENOUS at 08:55

## 2025-09-02 RX ADMIN — LISINOPRIL 40 MG: 20 TABLET ORAL at 08:17

## 2025-09-02 RX ADMIN — METHOCARBAMOL 750 MG: 750 TABLET ORAL at 06:41

## 2025-09-02 RX ADMIN — HYDRALAZINE HYDROCHLORIDE 10 MG: 20 INJECTION INTRAMUSCULAR; INTRAVENOUS at 06:41

## 2025-09-02 RX ADMIN — METHOCARBAMOL 750 MG: 750 TABLET ORAL at 00:05

## 2025-09-02 RX ADMIN — ONDANSETRON 4 MG: 4 TABLET, ORALLY DISINTEGRATING ORAL at 13:05

## 2025-09-02 RX ADMIN — HYDROCORTISONE 2.5%: 25 CREAM TOPICAL at 08:56

## 2025-09-02 RX ADMIN — EZETIMIBE 10 MG: 10 TABLET ORAL at 08:17

## 2025-09-02 RX ADMIN — ATORVASTATIN CALCIUM 80 MG: 80 TABLET, FILM COATED ORAL at 08:17

## 2025-09-02 RX ADMIN — Medication 10 MG: at 05:50

## 2025-09-02 RX ADMIN — HEPARIN SODIUM 5000 UNITS: 5000 INJECTION, SOLUTION INTRAVENOUS; SUBCUTANEOUS at 05:15

## 2025-09-02 ASSESSMENT — PAIN SCALES - GENERAL
PAINLEVEL_OUTOF10: 0
PAINLEVEL_OUTOF10: 0
PAINLEVEL_OUTOF10: 5
PAINLEVEL_OUTOF10: 8
PAINLEVEL_OUTOF10: 7

## 2025-09-02 ASSESSMENT — PAIN - FUNCTIONAL ASSESSMENT
PAIN_FUNCTIONAL_ASSESSMENT: 0-10
PAIN_FUNCTIONAL_ASSESSMENT: 0-10

## 2025-09-02 ASSESSMENT — PAIN DESCRIPTION - PAIN TYPE: TYPE: ACUTE PAIN

## 2025-09-03 ENCOUNTER — CARE COORDINATION (OUTPATIENT)
Dept: CARE COORDINATION | Age: 65
End: 2025-09-03

## 2025-09-03 LAB — SURGICAL PATHOLOGY REPORT: NORMAL

## 2025-09-04 ENCOUNTER — TELEPHONE (OUTPATIENT)
Dept: VASCULAR SURGERY | Age: 65
End: 2025-09-04

## 2025-09-04 ENCOUNTER — CARE COORDINATION (OUTPATIENT)
Dept: CARE COORDINATION | Age: 65
End: 2025-09-04

## 2025-09-04 DIAGNOSIS — I74.10 THROMBUS OF AORTA (HCC): Primary | ICD-10-CM

## 2025-09-04 DIAGNOSIS — I74.10 AORTIC THROMBUS (HCC): ICD-10-CM

## 2025-09-04 DIAGNOSIS — G89.12 ACUTE POST-THORACOTOMY PAIN: ICD-10-CM

## 2025-09-05 RX ORDER — HYDROCODONE BITARTRATE AND ACETAMINOPHEN 5; 325 MG/1; MG/1
1 TABLET ORAL EVERY 6 HOURS PRN
Qty: 56 TABLET | Refills: 0 | Status: SHIPPED | OUTPATIENT
Start: 2025-09-05 | End: 2025-09-19

## 2025-09-06 ENCOUNTER — HOSPITAL ENCOUNTER (EMERGENCY)
Age: 65
Discharge: HOME | End: 2025-09-06
Attending: EMERGENCY MEDICINE

## 2025-09-06 VITALS
SYSTOLIC BLOOD PRESSURE: 140 MMHG | DIASTOLIC BLOOD PRESSURE: 84 MMHG | HEART RATE: 61 BPM | RESPIRATION RATE: 18 BRPM | TEMPERATURE: 98.1 F | OXYGEN SATURATION: 95 %

## 2025-09-06 DIAGNOSIS — Z98.890 POST-OPERATIVE STATE: Primary | ICD-10-CM

## 2025-09-06 RX ORDER — POLYETHYLENE GLYCOL 3350 17 G/17G
17 POWDER, FOR SOLUTION ORAL DAILY
Qty: 510 G | Refills: 0 | Status: SHIPPED | OUTPATIENT
Start: 2025-09-06 | End: 2025-10-06

## 2025-09-06 ASSESSMENT — PAIN DESCRIPTION - DESCRIPTORS: DESCRIPTORS: ACHING

## 2025-09-06 ASSESSMENT — ENCOUNTER SYMPTOMS
ABDOMINAL PAIN: 0
NAUSEA: 0
VOMITING: 0

## 2025-09-06 ASSESSMENT — PAIN DESCRIPTION - ORIENTATION: ORIENTATION: RIGHT;LEFT

## 2025-09-06 ASSESSMENT — PAIN SCALES - GENERAL: PAINLEVEL_OUTOF10: 3

## 2025-09-06 ASSESSMENT — PAIN - FUNCTIONAL ASSESSMENT: PAIN_FUNCTIONAL_ASSESSMENT: 0-10

## 2025-09-06 ASSESSMENT — PAIN DESCRIPTION - LOCATION: LOCATION: GROIN

## (undated) DEVICE — ELECTRODE PT RET AD L9FT HI MOIST COND ADH HYDRGEL CORDED

## (undated) DEVICE — TOWEL SURG W17XL27IN BLU NONFENESTRATED RADPQ DISPOSABLE ST

## (undated) DEVICE — STERILE LATEX POWDER-FREE SURGICAL GLOVESWITH NITRILE COATING: Brand: PROTEXIS

## (undated) DEVICE — SUTURE VICRYL + SZ 3-0 L27IN ABSRB UD L26MM SH 1/2 CIR VCP416H

## (undated) DEVICE — STANDARD HYPODERMIC NEEDLE,POLYPROPYLENE HUB: Brand: MONOJECT

## (undated) DEVICE — AGENT HEMSTAT W6XL9IN OXIDIZED REGENERATED CELOS ABSRB FOR

## (undated) DEVICE — STERILE POLYISOPRENE POWDER-FREE SURGICAL GLOVES: Brand: PROTEXIS

## (undated) DEVICE — SYRINGE MED 10ML LUERLOCK TIP W/O SFTY DISP

## (undated) DEVICE — SUTURE N ABSRB L 36 IN SZ 5-0 NDL L 13 MM POLYPRO OR PPL BLU

## (undated) DEVICE — SUTURE PERMAHAND SZ 3-0 L30IN NONABSORBABLE BLK SH L26MM K832H

## (undated) DEVICE — BLADE ES L6IN ELASTOMERIC COAT EXT DURABLE BEND UPTO 90DEG

## (undated) DEVICE — INSERT SURG CLMP L33MM SET 2 JAW RADPQ DISP FOR FGRTY

## (undated) DEVICE — SUTURE ABSORBABLE L 60 IN SZ 0 NDL L 65 MM POLYDIOXANONE VIO

## (undated) DEVICE — SUTURE PROL SZ 4-0 L36IN NONABSORBABLE BLU L26MM SH 1/2 CIR 8521H

## (undated) DEVICE — INTENDED FOR TISSUE SEPARATION, AND OTHER PROCEDURES THAT REQUIRE A SHARP SURGICAL BLADE TO PUNCTURE OR CUT.: Brand: BARD-PARKER ® SAFETYLOCK CARBON RIB-BACK BLADES

## (undated) DEVICE — TAPE MED W1/8XL30IN WHT POLY

## (undated) DEVICE — SUTURE ETHIBOND EXCEL SZ 2-0 L30IN NONABSORBABLE GRN CT1 X423H

## (undated) DEVICE — SUTURE PERMA-HAND SZ 2 L60IN NONABSORBABLE BLK SILK BRAID SA8H

## (undated) DEVICE — SOLUTION IV IRRIG 1000ML POUR BTL 2F7114

## (undated) DEVICE — SUTURE PROL SZ 3-0 L48IN NONABSORBABLE BLU SH L26MM 1/2 CIR 8534H

## (undated) DEVICE — PAD,NON-ADHERENT,3X8,STERILE,LF,1/PK: Brand: MEDLINE

## (undated) DEVICE — SUTURE PROL SZ 6-0 L24IN NONABSORBABLE BLU L9.3MM BV-1 3/8 8805H

## (undated) DEVICE — SUTURE VICRYL + SZ 3-0 L36IN ABSRB UD L36MM CT-1 1/2 CIR VCP944H

## (undated) DEVICE — STRAP ARMBRD W1.5XL32IN FOAM STR YET SFT W/ HK AND LOOP

## (undated) DEVICE — GOWN,AURORA,NONREINFORCED,LARGE: Brand: MEDLINE

## (undated) DEVICE — STRAP POS FOAM SFT STR FOR KNEE BODY

## (undated) DEVICE — TOWEL,OR,DSP,ST,BLUE,STD,4/PK,20PK/CS: Brand: MEDLINE

## (undated) DEVICE — SUTURE PROL SZ 3-0 L36IN NONABSORBABLE BLU L26MM SH 1/2 CIR 8522H

## (undated) DEVICE — SUTURE VICRYL + SZ 2-0 L27IN ABSRB CLR CT-1 1/2 CIR TAPERCUT VCP259H

## (undated) DEVICE — SUTURE MCRYL SZ 3-0 L18IN ABSRB UD L19MM PS-2 3/8 CIR PRIM Y497G

## (undated) DEVICE — LOOP VES W25MM THK1MM MAXI RED SIL FLD REPELLENT 100 PER

## (undated) DEVICE — PENCIL ES L3M BTTN SWCH HOLSTER W/ BLDE ELECTRD EDGE

## (undated) DEVICE — ADHESIVE SKIN CLOSURE TOP 0.8 CC PREM PUR LIQUIBAND RAPID LF

## (undated) DEVICE — Device

## (undated) DEVICE — SOLUTION SURG PREP ANTIMICROBIAL 4 OZ SKIN WND EXIDINE

## (undated) DEVICE — GLOVE SURG SZ 75 CRM LTX FREE POLYISOPRENE POLYMER BEAD ANTI

## (undated) DEVICE — SUTURE PERMAHAND SZ 2-0 L30IN NONABSORBABLE BLK SILK W/O A305H

## (undated) DEVICE — TAPE UMB 72X7/32 IN

## (undated) DEVICE — ARROWHEAD LOCAL PACK: Brand: MEDLINE INDUSTRIES, INC.

## (undated) DEVICE — CONTAINER SPEC 4OZ PNEUMAT TB OR THRD LID PT ID LBL

## (undated) DEVICE — STERILE POLYISOPRENE POWDER-FREE SURGICAL GLOVES WITH EMOLLIENT COATING: Brand: PROTEXIS

## (undated) DEVICE — APPLICATOR MEDICATED 10.5 CC SOLUTION HI LT ORNG CHLORAPREP

## (undated) DEVICE — ADHESIVE SKIN CLSR 0.7ML TOP DERMBND ADV

## (undated) DEVICE — DRAPE SURG W33XL23IN FAB ANTIMIC GEN INCIS LNR FULL W HNDL

## (undated) DEVICE — SOLUTION IV IRRIG 500ML 0.9% SODIUM CHL 2F7123

## (undated) DEVICE — INSERT SURG L86MM SIL FILL RUB DBL HYDRAJAW ATRAUM REUSE

## (undated) DEVICE — DRESSING TRNSPAR W5XL4.5IN FLM SHT SEMIPERMEABLE WIND

## (undated) DEVICE — SOLUTION IV IRRIG POUR BRL 0.9% SODIUM CHL 2F7124

## (undated) DEVICE — PROTECTOR ULN NRV PUR FOAM HK LOOP STRP ANATOMICALLY

## (undated) DEVICE — Z DISCONTINUED BY MEDLINE USE 2711682 TRAY SKIN PREP DRY W/ PREM GLV

## (undated) DEVICE — DRAPE ORTH W77XL108IN POLYPR SMS SHT SPL STD ABSRB REINF

## (undated) DEVICE — SUTURE PERMA-HAND SZ 2-0 L30IN NONABSORBABLE BLK L26MM SH K833H

## (undated) DEVICE — DRAPE SURG W44XL66IN POLYPR DISP MEDI-SLUSH

## (undated) DEVICE — SUTURE PERMAHAND SZ 3-0 L30IN NONABSORBABLE BLK SILK BRAID A304H

## (undated) DEVICE — FOLEY TRAY 1 LAYR 16 FR 10 CC TEMP SNAPSECURE URO175816TS

## (undated) DEVICE — SUTURE MONOCRYL + SZ 4 0 L18IN ABSRB UD PC 3 L16MM 3 8 CIR PRIM MCP845G

## (undated) DEVICE — STRIP,CLOSURE,WOUND,MEDI-STRIP,1/2X4: Brand: MEDLINE

## (undated) DEVICE — PAD GEN USE BORDERED ADH 14IN 2IN AND 12IN 4IN GZ UNIV ST

## (undated) DEVICE — SPONGE GZ W6XL6.75IN COT 6 PLY SUP FLUF EXTRA ABSRB FOR PRE

## (undated) DEVICE — SPONGE GZ W4XL4IN RAYON POLY FILL CVR W/ NONWOVEN FAB

## (undated) DEVICE — TOWEL SURG W17XL27IN NAT COT DISP ST 80 PER CA

## (undated) DEVICE — PENCIL SMK EVAC BLADE COAT 70 MM BUTTON SWITCH NEPTUNE E-SEP

## (undated) DEVICE — COVER LT HNDL GRN PLAS SFT FLX 2 PER PK

## (undated) DEVICE — LOOP VES W13MM THK09MM MINI RED SIL FLD REPELLENT

## (undated) DEVICE — GOWN,AURORA,NON-REINFORCED,2XL: Brand: MEDLINE

## (undated) DEVICE — STRAP,POSITIONING,KNEE/BODY,FOAM,4X60": Brand: MEDLINE

## (undated) DEVICE — BAG ISOLATN W20XL20IN PLAS CUF OPN STR CLR FLM W SEAMLESS